# Patient Record
Sex: MALE | Race: WHITE
[De-identification: names, ages, dates, MRNs, and addresses within clinical notes are randomized per-mention and may not be internally consistent; named-entity substitution may affect disease eponyms.]

---

## 2019-03-17 ENCOUNTER — HOSPITAL ENCOUNTER (INPATIENT)
Dept: HOSPITAL 80 - FED | Age: 64
LOS: 8 days | Discharge: NURSING, REHAB OR LONG-TERM CARE | DRG: 57 | End: 2019-03-25
Attending: INTERNAL MEDICINE | Admitting: INTERNAL MEDICINE
Payer: COMMERCIAL

## 2019-03-17 DIAGNOSIS — R47.1: ICD-10-CM

## 2019-03-17 DIAGNOSIS — G12.20: Primary | ICD-10-CM

## 2019-03-17 DIAGNOSIS — K59.00: ICD-10-CM

## 2019-03-17 DIAGNOSIS — I10: ICD-10-CM

## 2019-03-17 DIAGNOSIS — B37.0: ICD-10-CM

## 2019-03-17 DIAGNOSIS — M62.81: ICD-10-CM

## 2019-03-17 DIAGNOSIS — R13.12: ICD-10-CM

## 2019-03-17 LAB
INR PPP: 0.98 (ref 0.83–1.16)
PLATELET # BLD: 267 10^3/UL (ref 150–400)
PROTHROMBIN TIME: 12.6 SEC (ref 12–15)

## 2019-03-17 PROCEDURE — G0480 DRUG TEST DEF 1-7 CLASSES: HCPCS

## 2019-03-17 PROCEDURE — A9585 GADOBUTROL INJECTION: HCPCS

## 2019-03-17 NOTE — EDPHY
H & P


Time Seen by Provider: 03/17/19 15:49


HPI/ROS: 





CHIEF COMPLAINT:  Slurred speech, right upper extremity weakness





HISTORY OF PRESENT ILLNESS:  The patient is a 64-year-old male who stated that 

he started to feel "funny" at 7:00 a.m..  The at noon he tried to get up and he 

fell.  He noticed right-sided weakness.  Patient had difficulty getting to the 

phone and was not unable to activate EMS until 14 14.





EMS activated the patient has a stroke alert.  The half-way was 119. 





REVIEW OF SYSTEMS:  


10 systems were reveiwed and are negative with the exception of the elements 

mentioned in the history of present illness.


Physical Exam: 





EMS vitals noted


GENERAL:  No acute distress, alert.  Protecting airway


HEENT:  Eyes normal to inspection, normal pharynx, no signs of dehydration.


NECK:  Normal, supple.  No spinal tenderness


RESPIRATORY:  Clear to auscultation bilaterally, no rales, rhonchi or wheezing.


CVS:  Regular rate and rhythm, no rubs, murmurs, or gallops.


ABDOMEN:  Soft, nontender, nondistended, no organomegaly.


BACK:  Normal to inspection, no CVA tenderness.


SKIN:  Normal color, no rash, warm, dry.  No pallor.


EXTREMITIES:  No pedal edema, no calf tenderness, no Homans sign or cords, no 

joint swelling.


NEURO/PSYCH:  Alert and oriented, normal mood and affect, slurred speech, right 

upper extremity weakness.  No obvious cranial nerve deficit.


Constitutional: 


 Initial Vital Signs











Temperature (C)  36.7 C   03/17/19 16:10


 


Heart Rate  110 H  03/17/19 16:10


 


Respiratory Rate  22 H  03/17/19 16:10


 


Blood Pressure  151/96 H  03/17/19 16:10


 


O2 Sat (%)  93   03/17/19 16:10








 











O2 Delivery Mode               Nasal Cannula


 


O2 (L/minute)                  2














Allergies/Adverse Reactions: 


 





Penicillins Allergy (Verified 03/17/19 16:17)


 








Home Medications: 














 Medication  Instructions  Recorded


 


Metoprolol Tartrate  03/17/19














Medical Decision Making





- Diagnostics


Imaging Results: 


 Imaging Impressions





Chest X-Ray  03/17/19 15:50


Impression:  Clear lungs. Negative portable chest.








Head CT  03/17/19 15:51


Impression: Negative. No acute intracranial hemorrhage or evidence of acute 

cortical ischemia.


 


Findings discussed with Emergency Department physician, Dr. Maryam Negron on 

March 17, 2019 at 1610 hours. 


 


 


 








Head CTA  03/17/19 15:51


Impression: 


1. Normal intracranial arterial circulation. No evidence of embolic disease or 

aneurysm.


2. Patent venous system.


 


 


CT Neck Angiogram:


 


Neck Angiogram: The cervicothoracic aorta gives rise to normal four-vessel neck 

anatomy. Origin of the carotid and vertebral arteries are widely patent. 

Minimal ostial plaque is present at bilateral vertebral artery origins. 

Calcified plaque in bilateral carotid bulbs results in less than 10% narrowing 

on the right, and less than 40% narrowing on the left. No ulcerated plaque, flow

-limiting stenosis, dissection, or occlusion. Bilateral vertebral arteries are 

patent and give rise to widely patent basilar artery.


 


Neck: The lung apices are clear, except for mild diffuse peribronchial 

thickening. No neck mass or lymphadenopathy. Moderate to severe degenerative 

disk disease is worse at the C4-C5, C5-C6 and C6-C7 levels. No fracture or bone 

lesion.


 


Impression:


1. No occlusion, dissection, or flow-limiting stenosis.


2. Calcified bilateral carotid plaque results in minimal right and mild left 

stenosis.


 


Findings discussed with Emergency Department physician, Dr. Maryam Negron on 

March 17, 2019 at 1616 hours.


 


 


 


 


 











ED Course/Re-evaluation: 





1550:  I met EMS on arrival.  I took report from the paramedic.  The patient 

went directly down to CT imaging.  Because the patient's symptoms started at 7:

00 a.m., he is in the 12 hr window.  Because of the timing, CT without and CT 

angio head and neck were ordered.





CBC and chemistry unremarkable.





16 10:  I discussed the case with Dr. Fierro.  Negative noncontrast head CT





16 17:  I discussed case with Dr. Fierro.  No occluding lesion on CT angio of 

head and neck.





16 20:  I discussed the case with [facility], Dr. Martinez.  We discussed 

the findings thus far.  At this time he is not feel the patient needs transfer.

  The patient is not a tPA candidate based on the onset of his symptoms at 7:00 

a.m..  There is no obstructing lesion on CT angiogram. 


Differential Diagnosis: 





My differential includes but is not limited to ischemic CVA, hemorrhagic CVA, 

dissection, aneurysm, electrolyte abnormality, sugar abnormality, dysrhythmia, 

ACS





- Data Points


Laboratory Results: 


 Laboratory Results





 03/17/19 15:52 





 03/17/19 15:52 





 











  03/17/19 03/17/19 03/17/19





  15:57 15:54 15:52


 


WBC      





    


 


RBC      





    


 


Hgb      





    


 


POC Hgb    17.0 gm/dL gm/dL  





    (13.7-17.5)  


 


Hct      





    


 


POC Hct    50 % %  





    (40-51)  


 


MCV      





    


 


MCH      





    


 


MCHC      





    


 


RDW      





    


 


Plt Count      





    


 


MPV      





    


 


Neut % (Auto)      





    


 


Lymph % (Auto)      





    


 


Mono % (Auto)      





    


 


Eos % (Auto)      





    


 


Baso % (Auto)      





    


 


Nucleat RBC Rel Count      





    


 


Absolute Neuts (auto)      





    


 


Absolute Lymphs (auto)      





    


 


Absolute Monos (auto)      





    


 


Absolute Eos (auto)      





    


 


Absolute Basos (auto)      





    


 


Absolute Nucleated RBC      





    


 


Immature Gran %      





    


 


Immature Gran #      





    


 


PT      





    


 


INR      





    


 


APTT      





    


 


POC Sodium    139 mEq/L mEq/L  





    (135-145)  


 


Sodium      137 mEq/L mEq/L





     (135-145) 


 


POC Potassium    4.3 mEq/L mEq/L  





    (3.3-5.0)  


 


Potassium      4.6 mEq/L mEq/L





     (3.5-5.2) 


 


POC Chloride    101 mEq/L mEq/L  





    ()  


 


Chloride      99 mEq/L mEq/L





     () 


 


Carbon Dioxide      19 mEq/l L mEq/l





     (22-31) 


 


POC Total CO2    22 mEq/L mEq/L  





    (22-31)  


 


Anion Gap      19 mEq/L H mEq/L





     (6-14) 


 


POC BUN    23 mg/dL mg/dL  





    (7-23)  


 


BUN      22 mg/dL mg/dL





     (7-23) 


 


Creatinine      0.6 mg/dL L mg/dL





     (0.7-1.3) 


 


POC Creatinine    0.6 mg/dL L mg/dL  





    (0.7-1.3)  


 


Estimated GFR      > 60 





    


 


Glucose      126 mg/dL H mg/dL





     () 


 


POC Glucose    131 mg/dL H mg/dL  





    ()  


 


Calcium      9.8 mg/dL mg/dL





     (8.5-10.4) 


 


POC Troponin I  0.03 ng/mL ng/mL    





   (0.00-0.08)   


 


Specimen Hemolysis      





    


 


Ethyl Alcohol      





    














  03/17/19 03/17/19 03/17/19





  15:52 15:52 15:50


 


WBC    7.46 10^3/uL 10^3/uL  





    (3.80-9.50)  


 


RBC    5.02 10^6/uL 10^6/uL  





    (4.40-6.38)  


 


Hgb    17.0 g/dL g/dL  





    (13.7-17.5)  


 


POC Hgb      





    


 


Hct    49.3 % %  





    (40.0-51.0)  


 


POC Hct      





    


 


MCV    98.2 fL fL  





    (81.5-99.8)  


 


MCH    33.9 pg pg  





    (27.9-34.1)  


 


MCHC    34.5 g/dL g/dL  





    (32.4-36.7)  


 


RDW    12.6 % %  





    (11.5-15.2)  


 


Plt Count    267 10^3/uL 10^3/uL  





    (150-400)  


 


MPV    8.6 fL L fL  





    (8.7-11.7)  


 


Neut % (Auto)    54.6 % %  





    (39.3-74.2)  


 


Lymph % (Auto)    40.3 % %  





    (15.0-45.0)  


 


Mono % (Auto)    3.5 % L %  





    (4.5-13.0)  


 


Eos % (Auto)    0.7 % %  





    (0.6-7.6)  


 


Baso % (Auto)    0.8 % %  





    (0.3-1.7)  


 


Nucleat RBC Rel Count    0.0 % %  





    (0.0-0.2)  


 


Absolute Neuts (auto)    4.07 10^3/uL 10^3/uL  





    (1.70-6.50)  


 


Absolute Lymphs (auto)    3.01 10^3/uL H 10^3/uL  





    (1.00-3.00)  


 


Absolute Monos (auto)    0.26 10^3/uL L 10^3/uL  





    (0.30-0.80)  


 


Absolute Eos (auto)    0.05 10^3/uL 10^3/uL  





    (0.03-0.40)  


 


Absolute Basos (auto)    0.06 10^3/uL 10^3/uL  





    (0.02-0.10)  


 


Absolute Nucleated RBC    0.00 10^3/uL 10^3/uL  





    (0-0.01)  


 


Immature Gran %    0.1 % %  





    (0.0-1.1)  


 


Immature Gran #    0.01 10^3/uL 10^3/uL  





    (0.00-0.10)  


 


PT  12.6 SEC SEC    





   (12.0-15.0)   


 


INR  0.98     





   (0.83-1.16)   


 


APTT  24.9 SEC SEC    





   (23.0-38.0)   


 


POC Sodium      





    


 


Sodium      





    


 


POC Potassium      





    


 


Potassium      





    


 


POC Chloride      





    


 


Chloride      





    


 


Carbon Dioxide      





    


 


POC Total CO2      





    


 


Anion Gap      





    


 


POC BUN      





    


 


BUN      





    


 


Creatinine      





    


 


POC Creatinine      





    


 


Estimated GFR      





    


 


Glucose      





    


 


POC Glucose      





    


 


Calcium      





    


 


POC Troponin I      





    


 


Specimen Hemolysis      410 





    


 


Ethyl Alcohol      < 10 mg/dL mg/dL





     (0-10) 











Medications Given: 


 








Discontinued Medications





Sodium Chloride (Ns)  500 mls @ 500 mls/hr IV EDNOW ONE


   PRN Reason: Protocol


   Stop: 03/17/19 16:49


   Last Admin: 03/17/19 16:39 Dose:  500 mls





Point of Care Test Results: 


 Chemistry











  03/17/19 03/17/19





  15:57 15:54


 


POC Sodium    139 mEq/L mEq/L





    (135-145) 


 


POC Potassium    4.3 mEq/L mEq/L





    (3.3-5.0) 


 


POC Chloride    101 mEq/L mEq/L





    () 


 


POC Total CO2    22 mEq/L mEq/L





    (22-31) 


 


POC BUN    23 mg/dL mg/dL





    (7-23) 


 


POC Creatinine    0.6 mg/dL L mg/dL





    (0.7-1.3) 


 


POC Glucose    131 mg/dL H mg/dL





    () 


 


POC Troponin I  0.03 ng/mL ng/mL  





   (0.00-0.08)  








 ISTAT H&H











  03/17/19





  15:54


 


POC Hgb  17.0 gm/dL gm/dL





   (13.7-17.5) 


 


POC Hct  50 % %





   (40-51) 














Departure





- Departure


Disposition: [facility] Inpatient Acute


Clinical Impression: 


 Aphasic disturbance, Weakness





Condition: Good


Referrals: 


Patient,NotPresent [Primary Care Provider] - As per Instructions

## 2019-03-17 NOTE — CPEKG
Test Reason : OPEN

Blood Pressure : ***/*** mmHG

Vent. Rate : 109 BPM     Atrial Rate : 109 BPM

   P-R Int : 179 ms          QRS Dur : 084 ms

    QT Int : 367 ms       P-R-T Axes : 041 043 042 degrees

   QTc Int : 495 ms

 

Sinus tachycardia

Left atrial enlargement

Borderline prolonged QT interval

 

Confirmed by Maryam Negron ([id number]) on 3/17/2019 11:06:56 PM

 

Referred By: Maryam Negron           Confirmed By:Maryam Negron

## 2019-03-17 NOTE — GHP
[f rep st]



                                                            HISTORY AND PHYSICAL





DATE OF ADMISSION:  03/17/2019



Mr. Washington is a 64-year-old gentleman with not much past medical history and, it sounds like, not a lot
 of medical care, who was brought into the emergency department as a stroke alert today.  He states luis f fernández felt funny around 7 a.m.  He tried to get up and he fell.  He had some right-sided weakness, and th
en it took him a while to get to the phone, and then ultimately he was able to get to the phone aroun
d 2 p.m. and came in at that point in time.  He had a stroke evaluation showing no intracranial hemor
rhage or other significant issue.  A noncontrast CT and head and neck CTA showed no significant vascu
lar disease. 



When I speak to the patient he is dysarthric, but his speech is fluent and he is able to repeat objec
ts, name a stethoscope, etc.  He said his voice has been like that for a number of months.  He does n
ot drink alcohol.  He does not appear intoxicated.  He does not have difficulty swallowing.  He state
s he is retired.  He spends much of his time at home.  It sounds like his neighbors said that he has 
been having on-and-off issues with speech since November when they were contacted by some emergency d
epartment personnel. 



The patient has no complaints such as shortness of breath, nausea, vomiting, diarrhea. 



He has thrush on his tongue.  It is unsure how long that has been there. 



The patient is alert and oriented and conversant.  I cannot understand what he used to do for work, b
ut he does not appear concerned that he is in the hospital and does not ask any questions.



REVIEW OF SYSTEMS:  Complete 10-point review of systems is conducted and negative except as noted in 
the HPI.



PAST MEDICAL HISTORY:  It sounds like hypertension.



ALLERGIES:  Penicillin.



HOME MEDICATIONS:  Metoprolol and enteric-coated aspirin.



SOCIAL HISTORY:  He is retired.  Lives in [address] [place] [address].  Denies tobacco or alcohol.



FAMILY HISTORY:  Reviewed and unremarkable.



PHYSICAL EXAMINATION:  PRESENTING VITALS:  Temperature 36.7, pulse 110, blood pressure 151/96, breath
ing __________ times a minute, 93% on room air.  GENERAL:  In no acute distress.  HEENT:  Sclerae ani
cteric.  Oropharynx clear.  Mucous membranes are moist.  NECK:  Supple without lymphadenopathy or JVD
.  LUNGS:  Clear to auscultation bilaterally.  HEART:  S1, S2.  ABDOMEN:  Soft, nontender, nondistend
ed.  LOWER EXTREMITIES:  Without edema.  CALVES:  Nontender.  SKIN:  Without rash.  NEUROLOGIC:  He i
s overall weak, but there is no right-sided weakness as described by some people.  His speech is dysa
rthric but fluent.  He has no real tremor that I can tell.  He does not have parkinsonian features.



LABORATORIES:  White count 7.5, hematocrit 49, platelets are 267,000, coags normal.  Sodium 137, pota
ssium 4.6, chloride 99, bicarb 19, BUN 22, creatinine 0.6, glucose 126.  Troponin 0.03, which is nega
tive in our system here.  Ethanol level is negative.  Head and neck CTA shows no significant vascular
 compromise with intact intracranial circulation and a 40% narrowing in the left carotid bulb.  Other
wise really unremarkable with no flow-limiting stenosis anywhere.  He has a patent venous system. Non
contrast head CT shows no atrophy, no midline shift, etc.  Chest x-ray shows clear lungs.  Chest x-ra
y interpreted by me.  EKG interpreted by me shows sinus tach at 109 with normal axis and intervals an
d no ST or T-wave changes.  I discussed the case with Dr. Maryam Negron.



ASSESSMENT/PLAN:  A 64-year-old gentleman with fall, dysarthria, tachycardia.

1.  Dysarthria.  This is kind of uncertain as to what is causing this.  He does have thrush which I n
ote.  I will check an MRI to evaluate for old stroke.  I will have speech therapy see him.  Neck CT d
id not show any mass.  He had a normal exam.

2.  Thrush.  Check HIV.  Will check a cortisol level.

3.  Sinus tachycardia.  The patient takes a beta blocker. Perhaps he stopped taking this.  Echocardio
gram has been ordered.  We will repeat a troponin.

4.  Encephalopathy.  The patient is modestly confused.  He has a normal chest x-ray.  We will send a 
urinalysis and urine cultures and check blood cultures.

5.  Inferior visual field cut.  I will go ahead and check an MRI as mentioned.  I will do this with alessandra morgan.

6.  Disposition:  Inpatient status.





Job #:  [id number]

## 2019-03-17 NOTE — ASMTCMCOM
CM Note

 

CM Note                       

Notes:

Reviewed chart. Pt presented to the Emergency Department via EMS with aphasia and right sided 

weakness - stroke alert. History includes Hepatitis C, back injury, HTN and flat foot. Pt is single 


and lives alone in [place]. Met with pt to assist in locating emergency contacts. Pt requested CM 

contact Peter Centeno, pt's neighbor. Peter located via online white pages. Call placed to Timoteo Centeno [phone], [address]. Peter was aware the pt had contacted EMS and was 

transported to [facility]. Peter has been Rudy's neighbor for quite some time, but states that "Rudy is 

very reclusive, private and doesn't share much." Peter reports "Rudy travels to [place], [place] a 


couple of times per year," but Peter is unclear what he does when he is there. Peter states that he 

and his wife noticed the pt "had increased weakness and difficulty with speech back in November 

when he returned from a trip to [facility]." Peter and Oralia will come see the pt tomorrow in the 

hospital. Update provided to Rudy.



Rudy requested CM contact Ismael Sabrina [phone], his friend from [place]. Call placed to 

Ismael. Ismael reports knowing the pt for many years. Ismael states the pt does not have any living 

family - the pt's brother  in . Ismael reports the pt has Hepatitis C, flat foot secondary to 


a back injury in the  and HTN. Ismael states the pt last visited [place] the end of 

October, early 2018. At that time Imsael noted the pt had slurred, slow speech and increased 


weakness. Ismael states the pt "self adjusted his anti-hypertensive medications in September or 

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 Ismael will keep in touch with the pt. Main hospital number provided. Update 

provided to Rudy.



Pt to be admitted for further evaluation and treatment. Discharge needs remain unclear at this 

time. Will await PT/OT evals. CM will continue to follow.







Discharge Plan: To be determined

 

Date Signed:  2019 06:32 PM

Electronically Signed By:Bailee Augustine RN

## 2019-03-18 LAB — HIV TYPE 1 AND 2: NEGATIVE

## 2019-03-18 RX ADMIN — ASPIRIN SCH: 81 TABLET, DELAYED RELEASE ORAL at 11:30

## 2019-03-18 RX ADMIN — ENOXAPARIN SODIUM SCH MG: 100 INJECTION SUBCUTANEOUS at 11:29

## 2019-03-18 RX ADMIN — SODIUM CHLORIDE SCH MLS: 900 INJECTION, SOLUTION INTRAVENOUS at 00:43

## 2019-03-18 RX ADMIN — NYSTATIN SCH UNIT: 500000 SUSPENSION ORAL at 22:57

## 2019-03-18 RX ADMIN — SODIUM CHLORIDE SCH MLS: 900 INJECTION, SOLUTION INTRAVENOUS at 13:14

## 2019-03-18 RX ADMIN — METOPROLOL SUCCINATE SCH: 50 TABLET, EXTENDED RELEASE ORAL at 11:31

## 2019-03-18 NOTE — ECHO
[email or web address]





[address] [address] 

Main: [phone] 



Echocardiography Examination 

Transthoracic 



Name:            TIAGO FRASER                           MR#:

[id number]

Study Date:      03/18/2019                             Study Time:

03:31 PM

YOB: 1955                             Age:

64 year(s)

Height:          180.3 cm (71 in.)                      Weight:

76.66 kg (169 lb.)

BSA:             1.96 m2                                Gender:

Male

Examination:     Echo                                   Contrast: 

Image Quality:   Adequate                               Rhythm: 

Heart Rate:      102 bpm                                BP:

129 mmHg/70 mmHg

Indication:      ischemic stroke 



Procedure Staff 

Referring Physician: 

Echocardiographer:         Jazmine Bhat RUST 

Reading Physician:         Wesley Newby MD 

Requesting Provider: 

Indication:                ischemic stroke 



Measurements 

Chambers                                           AV/MV 

Label                 Value       Normal Value          Label

Value       Normal Value

IVSd, 2D               1.1 cm     (0.6cm - 1.1cm)       AV PGmax

10 mmHg

LVDd, 2D               4.2 cm     (4.2cm - 5.9cm)       AV Vmax

1.58 m/s

LVDs, 2D               2.6 cm     (2.1cm - 4cm)         NAZ

(continuity eq.  2.2 cm2

LVEF, 2D               66 %       (54% - 74%)           Vmax) 

LVEF, BP               70 %       (55% - 70%)           MV A Vmax

0.96 m/s

LVEF, MOD2             71 %       (55% - 70%)           MV DT

143 ms

LVEF, MOD4             67 %       (55% - 70%)           MV E' lateral

0.08 m/s

LVOT PGmax             6 mmHg                           MV E' mean

0.07 m/s

LVOT Vmax              1.22 m/s   (0.7m/s - 1.1m/s)     MV E' septal

0.06 m/s

LVOTd                  1.9 cm     (1.9cm - 2.1cm)       MV E Vmax

0.66 m/s

LVPWd, 2D              1.1 cm     (0.6cm - 1cm)         MV E/A

0.69

RVDd, 2D               3.2 cm     (1.9cm - 3.8cm)       MV E/E'

lateral      8

TAPSE                  2.2 cm                           MV E/E' mean

9.43

LA Area, A2C           10.2 cm2   (0cm2 - 20cm2)        MV E/E' septal

11 (0.45 - 1.25)

LA Volume, A2C         19 ml      (18ml - 58ml)    TV/PV 

LA Volume, A4C         49 ml      (16ml - 34ml)         Label

Value       Normal Value

LA Volume, BP          33 ml      (18ml - 58ml)         PV PGmax

5 mmHg

LAD Index, 2D          1.63 cm/m2                       PV Vmax,

Caliper     1.12 m/s     (0.6m/s - 0.9m/s)

LADs, 2D               3.2 cm     (3cm - 4cm) 

LAESV index, MOD4      25 ml/m2 



Patient: TIAGO FRASER                        MRN: [id number]

Study Date: 03/18/2019   Page 1 of 3

03:31 PM 









RA Area 13 cm2 

Additional Vessels 

Label                 Value       Normal Value 

AoAsc                  2.9 cm 

AoRoot, 2D             3.2 cm     (1.4cm - 2.6cm) 



Conclusions 



Left Ventricle: 

 CONCLUSIONS:1)Normal to hyperdynamic LV systolic function with a

LVEF of 70% and normal wall motions.2)Mild

concentric LVH with mild diastolic dysfunction noted.3)Trivial TR

noted. Unable to assess accurate PA pressures.4)No

cardiac thrombus seen.5)No PFO or ASD noted by color Doppler.  



Findings 



Left Ventricle: 

Left ventricle is normal in size.  



CONCLUSIONS: 

1)Normal to hyperdynamic LV systolic function with a LVEF of 70% and

normal wall motions.

2)Mild concentric LVH with mild diastolic dysfunction noted. 

3)Trivial TR noted. Unable to assess accurate PA pressures. 

4)No cardiac thrombus seen. 

5)No PFO or ASD noted by color Doppler. The ejection fraction,

measured by Simpsons method, is 70 %. There is mild

concentric left ventricular hypertrophy. There are no regional wall

motion abnormalities. Grade I Diastolic Dysfunction.

Right Ventricle: 

Normal size right ventricle. Right ventricular wall thickness is

normal. Right ventricular systolic function is normal.

Left Atrium: 

The left atrium is normal in size.  

IAS: 

Normal appearing atrial septum.  

Right Atrium: 

The right atrium is normal in size.  

Mitral Valve: 

Normal . No mitral regurgitation. No mitral valve stenosis. There is

mild mitral annular calcification.

Aortic Valve: 

The aortic valve is structurally normal and trileaflet. No aortic

valve regurgitation. There is no aortic stenosis.

Tricuspid Valve: 

Tricuspid valve leaflets are normal in appearance and function.

Trivial tricuspid regurgitation. No tricuspid valve

stenosis. Pulmonary artery pressure cannot be assessed due to

inadequate TR signal.

Pulmonic Valve: 

Pulmonic leaflets exhibit normal cuspal separation. Trivial pulmonic

valve regurgitation is present.

Aorta: 

The aorta is normal. The aortic root size in 2D measures 3.2 cm. The

aortic root exhibits normal size. The ascending

aorta measures 2.9 cm. Ascending aorta is normal in size.  

Aorta Measurements 

AoRoot, 2D is 3.2 cm.  

IVC: 

The inferior vena cava is normal in size and course.  

Pericardium: 

A pericardial fat pad is present. A trivial pericardial effusion was

identified.



Exam Details 



Patient: TIAGO FRASER                        MRN: [id number]

Study Date: 03/18/2019   Page 2 of 3

03:31 PM 









Procedure Ordered: Echo 

Procedure Status:          Routine study 

Image Quality:             Adequate 

Facility Location:         Cardiac Echo 1 



Electronically signed by Wesley Newby MD on 03/18/2019 at 04:17 PM 

(No Signature Object) 



Patient: TIAGO FRASER                        MRN: [id number]

Study Date: 03/18/2019   Page 3 of 3

03:31 PM 







[id number]

## 2019-03-18 NOTE — HOSPPROG
Hospitalist Progress Note


Assessment/Plan: 





63 yo M w dysphagia, thrush, generalized weakness and possible visual field 

cut. no stroke on MRI





neuro: primary differential is myasthenia vs progressive supranuclear palsy


   d/w neuro, trial of steroids


   MG labs pending


   


thrush: HIV neg


   treat





dysphagia: nectar thick liquids


   ongoing SLP





weakness: likely related to #1





proph :lmwh





?stroke: no





dispo: inpt


   will need inpt  rehab


Subjective: case d/w dr stock.  mri largely neg.  seen by neuro


Objective: 


 Vital Signs











Temp Pulse Resp BP Pulse Ox


 


 36.6 C   92   18   129/70 H  95 


 


 03/18/19 11:24  03/18/19 16:00  03/18/19 11:24  03/18/19 11:24  03/18/19 11:24








 











 03/17/19 03/18/19 03/19/19





 05:59 05:59 05:59


 


Intake Total  500 


 


Output Total  600 


 


Balance  -100 








 











PT  12.6 SEC (12.0-15.0)   03/17/19  15:52    


 


INR  0.98  (0.83-1.16)   03/17/19  15:52    














- Physical Exam


Constitutional: no apparent distress, appears nourished


Eyes: PERRL, anicteric sclera


Ears, Nose, Mouth, Throat: other (thrush)


Cardiovascular: regular rate and rhythym, no murmur, rub, or gallop


Respiratory: no respiratory distress, no rales or rhonchi


Gastrointestinal: normoactive bowel sounds, soft, non-tender abdomen


Genitourinary: No power in urethra


Skin: warm, normal color


Musculoskeletal: No full muscle strength


Neurologic: AAOx3, other


Psychiatric: interacting appropriately





ICD10 Worksheet


Patient Problems: 


 Problems











Problem Status Onset


 


Aphasic disturbance Acute  


 


Weakness Acute

## 2019-03-18 NOTE — PDMN
Medical Necessity


Medical necessity: Pt meets inpt criteria per MD order and Neurology GRG. 64 y/

o s/p fall presented to ED w/dysarthria, confusion, and tachycardia. Neuor 

consult pending, SP/PT/OT evals and ECHO pending, persistent tachycardia 

through night,  anticipate>2MN for further workup of above.

## 2019-03-18 NOTE — NEUROPROG
Assessment: 


Gifty_1955


  -


Neurology Consult:


-


CC: Weakness, speech difficulties


-


HPI: 


3/18/19: Pt had been noting speech problems intermittently since November 2018 

but otherwise no significant medical problems.  On 3/17/19 at 7 am he noted 

right sided weakness that persisted per patient until 2 pm when he came to the 

[facility] ER for evaluation.  At that time he was only noted to have dysarthric 

speech which he indicated has been present since November 2018 and he was 

mildly confused but not focally weak.  Pt also had an oral thrush infection 

which may be affecting his speech.  Head CT, CTA head/neck, and brain MRI wwo 

all unremarkable.  Neurologic exam showed severe dysarthria, mild cognitive 

problems (thought it was 2018, problems with motor planning), generalized 

intermittent weakness, and possible right proximal focal weakness.  Given 

length of symptoms of weakness (hours) in setting of normal brain MRI makes TIA 

or stroke unlikely.  Pt seems to most likely have a neurodegenerative process 

such as progressive supranuclear palsy.  Treatment is likely symptomatic.  I 

will check labs for any myasthenia and have pt f/u in neurology clinic in 1-2 

weeks to obtain EMG/NCS looking for any denervation in the tongue.  Agree with 

PT/OT/Speech eval.


-


PMHx: HTN?


-


Home Meds: aspirin, metoprolol


-


SHx: retired


FHx: NC


-


ROS: Pt denied acute fever, total vision loss, active severe chest pain, 

respiratory failure, total body severe rash, total bowel/bladder incontinence, 

psychosis, active seizures, or active bleeding


-


O:


VS reviewed


General: Alert


Eyes: Fundoscopic exam not able to visualize optic disks


CV: Heart RRR, no murmur, no carotid bruit


Lungs: Clear to auscultation bilaterally, no rhonchi or rales


Neuro:


- Mental: 


.     Oriented x person/place but not date


.     concentration appears normal


.     speech fluency/comprehension difficult to test due to severe dysarthria 

but comprehension seems intact


.     memory appears normal


.     fund of knowledge appear intact


- Cranial Nerves:


.     II: PERRL, VFFTC


.     III/IV/VI: EOMI, no nystagmus, normal smooth pursuits, no Ptosis


.     V: facial sensation intact to LT


.     VII: face symmetric to eye closure and smile


.     VIII: hearing intact to conversation


.     IX/X: uvula raises symmetrically


.     XI: SCM 5/5 B/L strength


.     XII: tongue protrudes midline w/nl strength


- Motor: 


.     Tone: normal tone in all 4 extremity


.     Strength: proximal right arm weakness, intermittent generalized weakness


- Reflexes: B/L bic/BR/patella 2/4


- Sensory: all 4 extremity intact to light touch


- Coord: some problems following alternating hand movements seems concerning 

for motor planning


- Gait: deferred


-


Labs:


3/17/19- CBC wnl, coags wnl, Chem Cr 0.6L Gluc 131H, 


-


Rads:


3/17/19- Head CT wo: no acute bleed (I personally visualized the images on 3/17/

19)


3/17/19- Head/neck CTA: no flow limiting stenosis, no significant findings


3/17/19- Brain MRI wwo: no acute stroke, minimal white matter disease


-


Assessment:


1. Speech Disturbance, generalized weakness: concern for underlying 

neurodegenerative process such as bulbar supranuclear palsy


-


Plan:


- Labs: myasthenic panel, TSH, B12


- Agree with PT/OT/Speech


- F/U in neurology clinic in 1-2 weeks for EMG/NCS with Dr. Rizvi to check 

for any denervation in tongue





Objective: 





 Vital Signs











Temp Pulse Resp BP Pulse Ox


 


 36.6 C   103 H  18   148/81 H  96 


 


 03/18/19 08:00  03/18/19 08:00  03/18/19 08:00  03/18/19 08:00  03/18/19 08:00








 











 03/17/19 03/18/19 03/19/19





 05:59 05:59 05:59


 


Intake Total  500 


 


Output Total  600 


 


Balance  -100 








 











PT  12.6 SEC (12.0-15.0)   03/17/19  15:52    


 


INR  0.98  (0.83-1.16)   03/17/19  15:52    











Allergies/Adverse Reactions: 


 





Penicillins Allergy (Verified 03/17/19 16:17)

## 2019-03-19 RX ADMIN — METOPROLOL SUCCINATE SCH MG: 50 TABLET, EXTENDED RELEASE ORAL at 09:34

## 2019-03-19 RX ADMIN — NYSTATIN SCH UNIT: 500000 SUSPENSION ORAL at 05:00

## 2019-03-19 RX ADMIN — NYSTATIN SCH: 500000 SUSPENSION ORAL at 13:18

## 2019-03-19 RX ADMIN — ENOXAPARIN SODIUM SCH MG: 100 INJECTION SUBCUTANEOUS at 09:35

## 2019-03-19 RX ADMIN — NYSTATIN SCH UNIT: 500000 SUSPENSION ORAL at 16:44

## 2019-03-19 RX ADMIN — NYSTATIN SCH UNIT: 500000 SUSPENSION ORAL at 20:48

## 2019-03-19 RX ADMIN — ASPIRIN SCH MG: 81 TABLET, DELAYED RELEASE ORAL at 09:34

## 2019-03-19 RX ADMIN — SODIUM CHLORIDE SCH MLS: 900 INJECTION, SOLUTION INTRAVENOUS at 01:40

## 2019-03-19 NOTE — HOSPPROG
Hospitalist Progress Note


Assessment/Plan: 





65 yo M w dysphagia, thrush, generalized weakness and possible visual field 

cut. no stroke on MRI





neuro: primary differential is myasthenia vs progressive supranuclear palsy


   d/w neuro, trial of steroids


   MG labs pending


   


thrush: HIV neg


   treat





dysphagia: nectar thick liquids


   ongoing SLP





weakness: likely related to #1





proph :lmwh





?stroke: no





weakness: c spine mri today





dispo: inpt


   will need inpt  rehab


Subjective: case d/w dr stock.  no change w addition of steroids


Objective: 


 Vital Signs











Temp Pulse Resp BP Pulse Ox


 


 36.8 C   87   21 H  162/93 H  92 


 


 03/19/19 11:43  03/19/19 11:43  03/19/19 11:43  03/19/19 11:43  03/19/19 11:43








 











 03/18/19 03/19/19 03/20/19





 05:59 05:59 05:59


 


Intake Total 500 900 100


 


Output Total 600 900 525


 


Balance -100 0 -425








 











PT  12.6 SEC (12.0-15.0)   03/17/19  15:52    


 


INR  0.98  (0.83-1.16)   03/17/19  15:52    














- Physical Exam


Constitutional: no apparent distress, appears nourished


Eyes: PERRL, anicteric sclera


Ears, Nose, Mouth, Throat: moist mucous membranes, hearing normal


Cardiovascular: regular rate and rhythym, no murmur, rub, or gallop


Respiratory: no respiratory distress, no rales or rhonchi


Gastrointestinal: normoactive bowel sounds, soft, non-tender abdomen


Genitourinary: no bladder fullness, No power in urethra


Skin: warm, no induration


Musculoskeletal: No full muscle strength


Neurologic: No AAOx3





ICD10 Worksheet


Patient Problems: 


 Problems











Problem Status Onset


 


Aphasic disturbance Acute  


 


Weakness Acute

## 2019-03-19 NOTE — NEUROPROG
Assessment: 


Gifty_1955


  -


Neurology Consult:


-


CC: F/U for dysarthria


-


Narrative Summary:


3/18/19: Pt had been noting speech problems intermittently since November 2018 

but otherwise no significant medical problems.  On 3/17/19 at 7 am he noted 

right sided weakness that persisted per patient until 2 pm when he came to the 

[facility] ER for evaluation.  At that time he was only noted to have dysarthric 

speech which he indicated has been present since November 2018 and he was 

mildly confused but not focally weak.  Pt also had an oral thrush infection 

which may be affecting his speech.  Head CT, CTA head/neck, and brain MRI wwo 

all unremarkable.  Neurologic exam showed severe dysarthria, mild cognitive 

problems (thought it was 2018, problems with motor planning), generalized 

intermittent weakness, and possible right proximal focal weakness.  Given 

length of symptoms of weakness (hours) in setting of normal brain MRI makes TIA 

or stroke unlikely.  Pt seems to most likely have a neurodegenerative process 

such as progressive bulbar supranuclear palsy.  Treatment is likely 

symptomatic.  I will check labs for any myasthenia and have pt f/u in neurology 

clinic in 1-2 weeks to obtain EMG/NCS looking for any denervation in the 

tongue.  Agree with PT/OT/Speech eval.


-


HPI:


F/U 3/19/19.  Given how severely affected the patient is I feel it is 

reasonable to give a 7 day trial of prednisone 80 mg qd to see if this improves 

his symptoms (possibly atypical myasthenia gravis?).  However, if no clear 

improvement after 7 days then I will stop prednisone.  No clinical change in 

patient today.  B12/TSH unremarkable.


-


PMHx: HTN?


-


Home Meds: aspirin, metoprolol


-


SHx: retired


FHx: NC


-


ROS: Pt denied acute fever, total vision loss, active severe chest pain, 

respiratory failure, total body severe rash, total bowel/bladder incontinence, 

psychosis, active seizures, or active bleeding


-


Labs:


3/17/19- CBC wnl, coags wnl, Chem Cr 0.6L Gluc 131H, HIV 1 /2 negative


3/18/19- TSH wnl, B12 438


-


Rads:


3/17/19- Head CT wo: no acute bleed


3/17/19- Head/neck CTA: no flow limiting stenosis, no significant findings


3/17/19- Brain MRI wwo: no acute stroke, minimal white matter disease


-


Assessment:


1. Speech Disturbance, generalized weakness: concern for underlying 

neurodegenerative process such as progressive bulbar supranuclear palsy.  Will 

give empiric trial of prednisone 80 mg qd x 7 days (began on 3/18/19)


-


Plan:


- Labs: myasthenic panel (pending)


- empiric trial of prednisone 80 mg qd x 7 days (began on 3/18/19), if no clear 

improvement in 7 days then would stop steroids


- Agree with PT/OT/Speech


- F/U in neurology clinic in 1-2 weeks for EMG/NCS with Dr. Rizvi to check 

for any denervation in tongue





Objective: 





 Vital Signs











Temp Pulse Resp BP Pulse Ox


 


 36.8 C   105 H  20   153/91 H  93 


 


 03/19/19 08:00  03/19/19 08:00  03/19/19 08:00  03/19/19 08:00  03/19/19 08:00








 











 03/18/19 03/19/19 03/20/19





 05:59 05:59 05:59


 


Intake Total 500 900 100


 


Output Total 600 900 275


 


Balance -100 0 -175








 











PT  12.6 SEC (12.0-15.0)   03/17/19  15:52    


 


INR  0.98  (0.83-1.16)   03/17/19  15:52    











Allergies/Adverse Reactions: 


 





Penicillins Allergy (Verified 03/17/19 16:17)

## 2019-03-20 RX ADMIN — METOPROLOL SUCCINATE SCH MG: 50 TABLET, EXTENDED RELEASE ORAL at 08:42

## 2019-03-20 RX ADMIN — ENOXAPARIN SODIUM SCH MG: 100 INJECTION SUBCUTANEOUS at 08:44

## 2019-03-20 RX ADMIN — ASPIRIN SCH MG: 81 TABLET, DELAYED RELEASE ORAL at 08:43

## 2019-03-20 RX ADMIN — NYSTATIN SCH UNIT: 500000 SUSPENSION ORAL at 12:22

## 2019-03-20 RX ADMIN — NYSTATIN SCH UNIT: 500000 SUSPENSION ORAL at 05:44

## 2019-03-20 NOTE — HOSPPROG
Hospitalist Progress Note


Assessment/Plan: 





65 yo M w dysphagia, thrush, generalized weakness and possible visual field 

cut. no stroke on MRI





neuro: primary differential is myasthenia vs progressive supranuclear palsy


   d/w neuro, trial of steroids day 3/8


   MG labs pending


   


thrush: HIV neg


   treated





dysphagia: nectar thick liquids


   ongoing SLP





weakness: likely related to #1





proph :lmwh





?stroke: no





weakness: c spine mri w no cause





dispo: inpt


   will need inpt  rehab


Subjective: case d/w dr stock.  speech unchanged


Objective: 


 Vital Signs











Temp Pulse Resp BP Pulse Ox


 


 36.9 C   115 H  19   152/80 H  92 


 


 03/20/19 08:00  03/20/19 08:42  03/20/19 08:00  03/20/19 13:30  03/20/19 08:00








 











 03/19/19 03/20/19 03/21/19





 05:59 05:59 05:59


 


Intake Total 900 250 


 


Output Total 900 1300 180


 


Balance 0 -1050 -180








 











PT  12.6 SEC (12.0-15.0)   03/17/19  15:52    


 


INR  0.98  (0.83-1.16)   03/17/19  15:52    














- Physical Exam


Constitutional: no apparent distress, appears nourished


Eyes: PERRL, anicteric sclera


Ears, Nose, Mouth, Throat: other (dysarthric. thrush resolved)


Cardiovascular: regular rate and rhythym, no murmur, rub, or gallop


Respiratory: no respiratory distress, no rales or rhonchi


Gastrointestinal: normoactive bowel sounds, soft, non-tender abdomen


Genitourinary: no bladder fullness, No power in urethra


Skin: warm


Musculoskeletal: no muscle tenderness, No full muscle strength


Neurologic: AAOx3





ICD10 Worksheet


Patient Problems: 


 Problems











Problem Status Onset


 


Aphasic disturbance Acute  


 


Weakness Acute

## 2019-03-20 NOTE — GCON
[f 
rep st]



                                                                    CONSULTATION





NEUROSURGICAL CONSULTATION



DATE OF CONSULTATION:  03/20/2019



REASON FOR CONSULTATION:  Weakness.



HISTORY OF PRESENT ILLNESS:  The patient is a 64-year-old gentleman who was 
admitted on 03/17/2019, through the emergency department as a stroke alert.  
Apparently, the patient was having some difficulty with his speech and right-
sided weakness that prompted him to come to the emergency room.  He underwent 
imaging of the head and neck, which did not show any intracranial hemorrhage or 
other significant issues.  The patient was subsequently seen by Neurology and 
started on prednisone for his dysarthria and weakness, and has not improved 
significantly.  A neurosurgical consult was requested after a cervical MRI was 
performed demonstrating cervical degenerative disk disease with bilateral 
foraminal stenosis moderate-to-severe from C5 through C7. 



My conversation today with the patient was somewhat limited due to the patient'
s dysarthria; however, the patient reports that he has been gradually noticing 
upper and lower extremity weakness over the past 6 months since October of last 
year.  Approximately 2 weeks ago, he was unable to walk.  He states that he has 
not had any bowel or bladder changes.  His speech issues started 3 days ago.  
The patient was diagnosed with oral thrush, which has been treated, but does 
not seem to be affecting his overall speech ability.  The patient denies any 
neck pain or back pain.  He has not experienced any tingling.



PAST MEDICAL HISTORY:  Hypertension.



SOCIAL HISTORY:  The patient is retired.  He lives alone.  He does not use 
tobacco or alcohol.



FAMILY HISTORY:  The patient was unable to recall the cause of death of his 
mother or father.



ALLERGIES:  Penicillin.



PHYSICAL EXAM:  GENERAL:  Tired appearing 64-year-old male in no apparent 
distress.  HEAD, EARS, NOSE, THROAT:  The patient has a gray tongue and has 
recently been treated for thrush.  HEAD:  Normocephalic, atraumatic.  
EXTREMITIES:  Within normal limits, with the exception of notable muscle 
atrophy of the bilateral web spaces of both hands. 

NEUROLOGIC:  Patient is awake, alert, and oriented x4.  He is able to follow 
commands.  He has dysarthric speech.  He has no facial droop.  He has normal 
sensation in the V1, V2, V3 distributions bilaterally.  He is hyporeflexic in 
bilateral biceps and brachioradialis with negative Holt's.  He has 2+ out of 
4 bilateral patellar tendon reflexes with no clonus.  He has generalized 
weakness of the bilateral upper and lower extremities with 2/4 strength in the 
right biceps and triceps and 2/4 strength in bilateral  and wrist flexors 
and wrist extensors.  He has 4/5 strength in bilateral deltoids, 5/5 strength 
in bilateral shoulder shrugs.  He has 3/4 strength in right dorsiflexor and 
plantar flexor, 4/5 in left dorsiflexor, and 3/5 in left plantar flexor.  He 
has 5/5 strength in bilateral iliopsoas, 4/5 in bilateral quads and hamstrings.



IMAGING:  Tthe MRI of the cervical spine from 03/19/2019, shows moderate-to-
severe degenerative disk disease from C4-5 through C6-7 with dorsal disk 
osteophyte complexes and bilateral facet arthropathy contributing to mild-to-
moderate central canal stenosis, worse at C4-5 and C6-7, and moderate-to-severe 
severe bilateral foraminal stenosis at C4-5 and C6-7.  There is no abnormal 
cord signal.



IMPRESSION:  This is a 64-year-old gentleman who reports progressively 
worsening upper and lower extremity weakness for the past 6 months with 
inability to walk starting approximately 2 weeks ago and recent onset of 
dysarthric speech.  These global symptoms are not explained by his cervical 
spine findings and we would favor a neurodegenerative process instead.  
Neurology is also following this patient and there are labs pending for 
myasthenia gravis.  Blood cultures from the 17th have no growth to date.  At 
this time, no neurosurgical intervention would be recommended and we would 
defer further treatment to Neurology.  This information was discussed with Dr. Jeremi Robb today.



Patient was seen by Neurosurgery staff at approx 5pm on 3/20.  The cervical 
spine findings do not explain the diffuse weakness and no treatment is needed 
currently for the cervical findings.  We continue to defer to our colleagues in 
Neurology.  Please feel free to call us with questions.





Job #:  [id number]

MTDD

## 2019-03-20 NOTE — NEUROPROG
Assessment: 


Gifty_1955


  -


Neurology Consult:


-


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


-


Narrative Summary:


3/18/19: Pt had been noting speech problems intermittently since November 2018 

but otherwise no significant medical problems.  On 3/17/19 at 7 am he noted 

right sided weakness that persisted per patient until 2 pm when he came to the 

[facility] ER for evaluation.  At that time he was only noted to have dysarthric 

speech which he indicated has been present since November 2018 and he was 

mildly confused but not focally weak.  Head CT, CTA head/neck, and brain MRI 

wwo all unremarkable.  Neurologic exam showed severe dysarthria, mild cognitive 

problems (thought it was 2018, problems with motor planning), generalized 

intermittent weakness, and possible right proximal focal weakness.  Given 

length of symptoms of weakness (hours) in setting of normal brain MRI makes TIA 

or stroke unlikely.  Pt seems to most likely have a neurodegenerative process 

such as a motor neuron disease affecting bulbar muscles.  Treatment is likely 

symptomatic.  I will check labs for any myasthenia and have pt f/u in neurology 

clinic in 1-2 weeks to obtain EMG/NCS looking for any denervation in the tongue 

with Dr. Adam Rizvi (Neuromuscular expert).


-


F/U 3/19/19.  Given how severely affected the patient is I feel it is 

reasonable to give a 7 day trial of prednisone 80 mg qd to see if this improves 

his symptoms (possibly atypical myasthenia gravis?).  However, if no clear 

improvement after 7 days then I will stop prednisone.  No clinical change in 

patient today.  B12/TSH unremarkable.


-


HPI:


F/U 3/20/19.  No improvement of dysarthria on steroids.  Pt had cervical MRI 

showing mild to mod degen changes but no clear explanation for his severe 

dysarthria.  No new events.  Pt sitting up in chair.


-


PMHx: HTN?


-


SHx: retired


FHx: NC


-


ROS: Pt denied acute fever, total vision loss, active severe chest pain, 

respiratory failure, total body severe rash, total bowel/bladder incontinence, 

psychosis, active seizures, or active bleeding


-


Labs:


3/17/19- CBC wnl, coags wnl, Chem Cr 0.6L Gluc 131H, HIV 1 /2 negative


3/18/19- TSH wnl, B12 438


-


Rads:


3/17/19- Head CT wo: no acute bleed


3/17/19- Head/neck CTA: no flow limiting stenosis, no significant findings


3/17/19- Brain MRI wwo: no acute stroke, minimal white matter disease


3/19/19- Cervical MRI wwo: Moderate to severe degenerative disk disease from C4-

5 through C6-C7 with dorsal disk/osteophyte complexes, bilateral uncovertebral 

osteophytes and bilateral facet arthropathy resulting in mild to moderate 

central canal stenosis, worst at C4-C5 and C6-C7, and moderate to severe 

bilateral neural foraminal stenosis.  Mild cord compression without cord edema 

or myelomalacia.  No enhancing lesions.


-


Assessment:


1. 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 

concern for underlying neurodegenerative process such as bulbar variant of 

motor neuron disease.  Will give empiric trial of prednisone 80 mg qd x 7 days (

began on 3/18/19)


-


2. Cervical MRI wwo on 3/19/19 showing moderate to severe degen changes: Not 

likely the cause of his severe dysarthria.  I do not suspect this is a 

symptomatic finding but I will re-consider if he develops neck pain or any new 

symptoms suggesting cord compression (increased tone, change in bowel/bladder 

habits, numbness in arms/legs)


-


Plan:


- Labs: myasthenic panel (pending)


- empiric trial of prednisone 80 mg qd x 7 days (began on 3/18/19), if no clear 

improvement in 7 days then would stop steroids


- Agree with PT/OT/Speech


- F/U in neurology clinic in 1-2 weeks for EMG/NCS with Dr. Rizvi to check 

for any denervation in tongue





Objective: 





 Vital Signs











Temp Pulse Resp BP Pulse Ox


 


 36.9 C   115 H  19   160/97 H  92 


 


 03/20/19 08:00  03/20/19 08:42  03/20/19 08:00  03/20/19 08:42  03/20/19 08:00








 











 03/19/19 03/20/19 03/21/19





 05:59 05:59 05:59


 


Intake Total 900 250 


 


Output Total 900 1300 180


 


Balance 0 -1050 -180








 











PT  12.6 SEC (12.0-15.0)   03/17/19  15:52    


 


INR  0.98  (0.83-1.16)   03/17/19  15:52    











Allergies/Adverse Reactions: 


 





Penicillins Allergy (Verified 03/17/19 16:17)

## 2019-03-20 NOTE — ASMTCMCOM
CM Note

 

CM Note                       

Notes:

Pts case discussed w/ Dr. Castanon. Therapies all continue to recommend inpatient rehab. CM spoke to 

[facility] about this pt and she reports that they will most likely not be able to take him. CM 


met w/ pt. Pt is agreeable to going to SNF. CM provided him w/ senior blue book. Pt chose [facility] because he wanted to stay in [place]. Referral sent and non triggering pasrr completed. CM to 

follow.







Plan: Inpatient rehab vs SNF/[facility]

 

Date Signed:  03/20/2019 04:22 PM

Electronically Signed By:FATUMA Kraft

## 2019-03-21 RX ADMIN — METOPROLOL SUCCINATE SCH MG: 50 TABLET, EXTENDED RELEASE ORAL at 08:48

## 2019-03-21 RX ADMIN — ASPIRIN SCH MG: 81 TABLET, DELAYED RELEASE ORAL at 08:46

## 2019-03-21 RX ADMIN — ENOXAPARIN SODIUM SCH MG: 100 INJECTION SUBCUTANEOUS at 08:48

## 2019-03-21 NOTE — NEUROPROG
Assessment: 


Gifty_1955


  -


Neurology Consult:


-


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


-


Narrative Summary:


3/18/19: Pt had been noting speech problems intermittently since November 2018 

but otherwise no significant medical problems.  On 3/17/19 at 7 am he noted 

right sided weakness that persisted per patient until 2 pm when he came to the 

[facility] ER for evaluation.  At that time he was only noted to have dysarthric 

speech which he indicated has been present since November 2018 and he was 

mildly confused but not focally weak.  Head CT, CTA head/neck, and brain MRI 

wwo all unremarkable.  Neurologic exam showed severe dysarthria, mild cognitive 

problems (thought it was 2018, problems with motor planning), generalized 

intermittent weakness, and possible right proximal focal weakness.  Given 

length of symptoms of weakness (hours) in setting of normal brain MRI makes TIA 

or stroke unlikely.  Pt seems to most likely have a neurodegenerative process 

such as a motor neuron disease affecting bulbar muscles.  Treatment is likely 

symptomatic.  I will check labs for any myasthenia and have pt f/u in neurology 

clinic in 1-2 weeks to obtain EMG/NCS looking for any denervation in the tongue 

with Dr. Adam Rizvi (Neuromuscular expert).


-


F/U 3/19/19.  Given how severely affected the patient is I feel it is 

reasonable to give a 7 day trial of prednisone 80 mg qd to see if this improves 

his symptoms (possibly atypical myasthenia gravis?).  However, if no clear 

improvement after 7 days then I will stop prednisone.  No clinical change in 

patient today.  B12/TSH unremarkable.


-


F/U 3/20/19.  No improvement of dysarthria on steroids.  Pt had cervical MRI 

showing mild to mod degen changes but no clear explanation for his severe 

dysarthria.  No new events.  Pt sitting up in chair.


-


HPI:


F/U 3/21/19.  Still no improvement on steroids.  Neurosurgery did not feel his 

cervical degen disease required any surgical intervention at this time.  No new 

events.  Pt denied new complaints.


-


PMHx: HTN?


-


SHx: retired


FHx: NC


-


ROS: Pt denied acute fever, total vision loss, active severe chest pain, 

respiratory failure, total body severe rash, total bowel/bladder incontinence, 

psychosis, active seizures, or active bleeding


-


Labs:


3/17/19- CBC wnl, coags wnl, Chem Cr 0.6L Gluc 131H, HIV 1 /2 negative


3/18/19- TSH wnl, B12 438


-


Rads:


3/17/19- Head CT wo: no acute bleed


3/17/19- Head/neck CTA: no flow limiting stenosis, no significant findings


3/17/19- Brain MRI wwo: no acute stroke, minimal white matter disease


3/19/19- Cervical MRI wwo: Moderate to severe degenerative disk disease from C4-

5 through C6-C7 with dorsal disk/osteophyte complexes, bilateral uncovertebral 

osteophytes and bilateral facet arthropathy resulting in mild to moderate 

central canal stenosis, worst at C4-C5 and C6-C7, and moderate to severe 

bilateral neural foraminal stenosis.  Mild cord compression without cord edema 

or myelomalacia.  No enhancing lesions.


-


Assessment:


1. 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 

concern for underlying neurodegenerative process such as bulbar variant of 

motor neuron disease.  Will give empiric trial of prednisone 80 mg qd x 7 days (

began on 3/18/19)


-


2. Cervical MRI wwo on 3/19/19 showing moderate to severe degen changes: Not 

likely the cause of his severe dysarthria.  I do not suspect this is a 

symptomatic finding but I will re-consider if he develops neck pain or any new 

symptoms suggesting cord compression (increased tone, change in bowel/bladder 

habits, numbness in arms/legs).  Neurosurgery saw on 3/21/19 and did not 

recommend surgery.


-


Plan:


- Labs: myasthenic panel (pending)


- empiric trial of prednisone 80 mg qd x 7 days (began on 3/18/19), if no clear 

improvement in 7 days then would stop steroids


- Agree with PT/OT/Speech


- F/U in neurology clinic in 1-2 weeks for EMG/NCS with Dr. Rizvi to check 

for any denervation in tongue





Objective: 





 Vital Signs











Temp Pulse Resp BP Pulse Ox


 


 36.7 C   97   16   163/92 H  93 


 


 03/21/19 07:24  03/21/19 08:48  03/21/19 07:24  03/21/19 08:48  03/21/19 07:24








 











 03/20/19 03/21/19 03/22/19





 05:59 05:59 05:59


 


Intake Total 250 600 


 


Output Total 1300 980 


 


Balance -1050 -380 








 











PT  12.6 SEC (12.0-15.0)   03/17/19  15:52    


 


INR  0.98  (0.83-1.16)   03/17/19  15:52    











Allergies/Adverse Reactions: 


 





Penicillins Allergy (Verified 03/17/19 16:17)

## 2019-03-21 NOTE — NEUSURGPN
Assessment/Plan: 





65y/o male with diffuse weakness, worse in BUE and RLE with C5-7 degenerative 

changes





-Discussed with patient that his cervical spine MRI findings do not explain his 

weakness and we would not recommend surgery at this time for this


-Continue PT/OT


-Will s/o off at this time as no acute neurosurgical intervention


-Discussed with Dr Robb


-Please notify NS with any change in neuro/motor exam


Subjective: 


BUE weakness the same as yesterday. Not complain of new pain down BUE


Objective: 


NAD A&Ox3


BUE diffusely 4-/5 throughout,except deltoids 3/5 bilaterally


RLE DF/EHL 2/5, 5-/5 throughout otherwise





- Physician


Discussed Patient with : Cezar





Neurosurgery Physical Exam





- Vitals, I&O, Labs





 I and O











 03/20/19 03/21/19 03/22/19





 05:59 05:59 05:59


 


Intake Total 250 600 


 


Output Total 1300 980 


 


Balance -1050 -380 


 


Intake:   


 


  Oral (ml) 250 600 


 


Output:   


 


  Urine (ml) 1300 980 


 


    Urinal 1300 980 


 


Other:   


 


  Intake Quantity  Yes 





  Sufficient   


 


  Number of Voids   


 


    Incontinence  1 


 


    Urinal 1 1 


 


  Number of Stools   


 


    Toilet 1 1 








 Vital Signs











Temp Pulse Resp BP Pulse Ox


 


 36.7 C   99   16   164/94 H  93 


 


 03/21/19 07:24  03/21/19 07:24  03/21/19 07:24  03/21/19 07:24  03/21/19 07:24














ICD10 Worksheet


Patient Problems: 


 Problems











Problem Status Onset


 


Aphasic disturbance Acute  


 


Weakness Acute

## 2019-03-21 NOTE — HOSPPROG
Hospitalist Progress Note


Assessment/Plan: 





63 yo M w dysphagia, thrush, generalized weakness and possible visual field 

cut. no stroke on MRI





neuro: primary differential is myasthenia vs progressive supranuclear palsy


   d/w neuro, trial of steroids day 3/8


   MG labs pending





   slightly improved today


   follow daily


   await MG labs


   


thrush: HIV neg


   treated





dysphagia: nectar thick liquids


   ongoing SLP





weakness: likely related to #1





proph :lmwh





?stroke: no





weakness: c spine mri w no cause





dispo: inpt


   will need inpt  rehab


Subjective: more alert, stronger, sitting up in chair


Objective: 


 Vital Signs











Temp Pulse Resp BP Pulse Ox


 


 36.9 C   87   11 L  144/97 H  92 


 


 03/21/19 15:28  03/21/19 15:28  03/21/19 15:28  03/21/19 15:28  03/21/19 15:28








 











 03/20/19 03/21/19 03/22/19





 05:59 05:59 05:59


 


Intake Total 250 600 240


 


Output Total 1300 980 225


 


Balance -1050 -380 15








 











PT  12.6 SEC (12.0-15.0)   03/17/19  15:52    


 


INR  0.98  (0.83-1.16)   03/17/19  15:52    














- Physical Exam


Constitutional: no apparent distress, appears nourished


Eyes: PERRL, anicteric sclera


Ears, Nose, Mouth, Throat: moist mucous membranes, hearing normal


Cardiovascular: regular rate and rhythym, no murmur, rub, or gallop


Respiratory: no respiratory distress, no rales or rhonchi


Gastrointestinal: normoactive bowel sounds, soft, non-tender abdomen


Genitourinary: no bladder fullness, No power in urethra


Skin: warm, normal color


Musculoskeletal: no muscle tenderness


Neurologic: other (dystarthric)





ICD10 Worksheet


Patient Problems: 


 Problems











Problem Status Onset


 


Aphasic disturbance Acute  


 


Weakness Acute

## 2019-03-22 RX ADMIN — ENOXAPARIN SODIUM SCH MG: 100 INJECTION SUBCUTANEOUS at 09:52

## 2019-03-22 RX ADMIN — ASPIRIN SCH MG: 81 TABLET, DELAYED RELEASE ORAL at 09:53

## 2019-03-22 RX ADMIN — METOPROLOL SUCCINATE SCH MG: 50 TABLET, EXTENDED RELEASE ORAL at 09:52

## 2019-03-22 NOTE — ASMTCMCOM
CM Note

 

CM Note                       

Notes:

Pts case discussed w/ Dr. Castanon and Kalyani Le. Kalyani will continue to follow this case to 

see if pt is appropriate for inpatient rehab. CM met w/ pt for dispo planning. CM told pt that he 

has been accepted to [facility] and inpatient rehab is still seeing if he qualifies. Pt reports 

that it is not an option for his friend from [place] to come up to stay w/ him. Pt reports that his 

neighbor Peter that lives next door is his only support. Pt agreed for CM to call Peter. CM spoke to 

Peter on the phone and he said that he would be able to check in once in awhile but cannot be there 

daily nor can he provide 24/7 supervision. CM to follow.







Plan: TBD

 

Date Signed:  03/22/2019 03:25 PM

Electronically Signed By:FATUMA Kraft

## 2019-03-22 NOTE — NEUROPROG
Assessment: 


Gifty_1955


  -


Neurology Consult:


-


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


-


Narrative Summary:


3/18/19: Pt had been noting speech problems intermittently since November 2018 

but otherwise no significant medical problems.  On 3/17/19 at 7 am he noted 

right sided weakness that persisted per patient until 2 pm when he came to the 

[facility] ER for evaluation.  At that time he was only noted to have dysarthric 

speech which he indicated has been present since November 2018 and he was 

mildly confused but not focally weak.  Head CT, CTA head/neck, and brain MRI 

wwo all unremarkable.  Neurologic exam showed severe dysarthria, mild cognitive 

problems (thought it was 2018, problems with motor planning), generalized 

intermittent weakness, and possible right proximal focal weakness.  Given 

length of symptoms of weakness (hours) in setting of normal brain MRI makes TIA 

or stroke unlikely.  Pt seems to most likely have a neurodegenerative process 

such as a motor neuron disease affecting bulbar muscles.  Treatment is likely 

symptomatic.  I will check labs for any myasthenia and have pt f/u in neurology 

clinic in 1-2 weeks to obtain EMG/NCS looking for any denervation in the tongue 

with Dr. Adam Rizvi (Neuromuscular expert).


-


F/U 3/19/19.  Given how severely affected the patient is I feel it is 

reasonable to give a 7 day trial of prednisone 80 mg qd to see if this improves 

his symptoms (possibly atypical myasthenia gravis?).  However, if no clear 

improvement after 7 days then I will stop prednisone.  No clinical change in 

patient today.  B12/TSH unremarkable.


-


F/U 3/20/19.  No improvement of dysarthria on steroids.  Pt had cervical MRI 

showing mild to mod degen changes but no clear explanation for his severe 

dysarthria.  No new events.  Pt sitting up in chair.


-


F/U 3/21/19.  Still no improvement on steroids.  Neurosurgery did not feel his 

cervical degen disease required any surgical intervention at this time.  No new 

events.  Pt denied new complaints.


-


HPI:


F/U 3/22/19.  Still no improvement on steroids.  If no dramatic improvement 

after completing 7 days of steroids then I would discontinue.  No new events.  

No further neurologic w/u needed so neurology will sign off.  He should call 

our clinic at hospital discharge to set up an EMG/NCS with Dr. Rizvi as the 

next step in his evaluation process.


-


PMHx: HTN?


-


SHx: retired


FHx: NC


-


ROS: Pt denied acute fever, total vision loss, active severe chest pain, 

respiratory failure, total body severe rash, total bowel/bladder incontinence, 

psychosis, active seizures, or active bleeding


-


Labs:


3/17/19- CBC wnl, coags wnl, Chem Cr 0.6L Gluc 131H, HIV 1 /2 negative


3/18/19- TSH wnl, B12 438


-


Rads:


3/17/19- Head CT wo: no acute bleed


3/17/19- Head/neck CTA: no flow limiting stenosis, no significant findings


3/17/19- Brain MRI wwo: no acute stroke, minimal white matter disease


3/19/19- Cervical MRI wwo: Moderate to severe degenerative disk disease from C4-

5 through C6-C7 with dorsal disk/osteophyte complexes, bilateral uncovertebral 

osteophytes and bilateral facet arthropathy resulting in mild to moderate 

central canal stenosis, worst at C4-C5 and C6-C7, and moderate to severe 

bilateral neural foraminal stenosis.  Mild cord compression without cord edema 

or myelomalacia.  No enhancing lesions.


-


Assessment:


1. 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 

concern for underlying neurodegenerative process such as bulbar variant of 

motor neuron disease.  Will give empiric trial of prednisone 80 mg qd x 7 days (

began on 3/18/19)


-


2. Cervical MRI wwo on 3/19/19 showing moderate to severe degen changes: Not 

likely the cause of his severe dysarthria.  I do not suspect this is a 

symptomatic finding but I will re-consider if he develops neck pain or any new 

symptoms suggesting cord compression (increased tone, change in bowel/bladder 

habits, numbness in arms/legs).  Neurosurgery saw on 3/21/19 and did not 

recommend surgery.


-


Plan:


- Labs: myasthenic panel (pending)


- empiric trial of prednisone 80 mg qd x 7 days (began on 3/18/19), if no clear 

improvement in 7 days then would stop steroids


- Agree with PT/OT/Speech, likely will need long term care placement


- F/U in neurology clinic in 1-2 weeks for EMG/NCS with Dr. Rizvi to check 

for any denervation in tongue


- No further neurologic inpt w/u needed, neurology will sign off





Objective: 





 Vital Signs











Temp Pulse Resp BP Pulse Ox


 


 36.7 C   81   21 H  153/95 H  90 L


 


 03/22/19 11:02  03/22/19 11:02  03/22/19 11:02  03/22/19 11:02  03/22/19 11:02








 











 03/21/19 03/22/19 03/23/19





 05:59 05:59 05:59


 


Intake Total 600 1540 570


 


Output Total 980 225 250


 


Balance -380 1315 320








 











PT  12.6 SEC (12.0-15.0)   03/17/19  15:52    


 


INR  0.98  (0.83-1.16)   03/17/19  15:52    











Allergies/Adverse Reactions: 


 





Penicillins Allergy (Verified 03/17/19 16:17)

## 2019-03-22 NOTE — HOSPPROG
Hospitalist Progress Note


Assessment/Plan: 





63 yo M w dysphagia, thrush, generalized weakness and possible visual field 

cut. no stroke on MRI





neuro: primary differential is myasthenia vs progressive supranuclear palsy


   d/w neuro, trial of steroids day 5/8


   MG labs pending





   slightly improved today


   follow daily


   await MG labs- these will not be back until 4/2





   making considerable progress over last 2 days


      this is encouraging 


   


   


thrush: HIV neg


   treated





dysphagia: nectar thick liquids


   ongoing SLP





weakness: likely related to #1





proph :lmwh





?stroke: no





weakness: c spine mri w no cause





dispo: inpt


   will need inpt  rehab





   i have discussed this w yessenia and rick baker (RN leadership at inpt rehab

) advocating for placement at inpt rehab. i believe he will thrive and improve 

there


Subjective: case d/w dr stock


Objective: 


 Vital Signs











Temp Pulse Resp BP Pulse Ox


 


 36.7 C   81   21 H  153/95 H  90 L


 


 03/22/19 11:02  03/22/19 11:02  03/22/19 11:02  03/22/19 11:02  03/22/19 11:02








 











 03/21/19 03/22/19 03/23/19





 05:59 05:59 05:59


 


Intake Total 600 1540 1250


 


Output Total 980 225 600


 


Balance -380 1315 650








 











PT  12.6 SEC (12.0-15.0)   03/17/19  15:52    


 


INR  0.98  (0.83-1.16)   03/17/19  15:52    














- Physical Exam


Constitutional: no apparent distress, appears nourished


Eyes: PERRL, anicteric sclera


Ears, Nose, Mouth, Throat: moist mucous membranes, hearing normal


Cardiovascular: regular rate and rhythym, no murmur, rub, or gallop


Respiratory: no respiratory distress, no rales or rhonchi


Gastrointestinal: normoactive bowel sounds, soft, non-tender abdomen


Genitourinary: no bladder fullness, No power in urethra


Skin: warm, normal color


Neurologic: other ( strength now 4/5 (2/5 on admit) voice stronger w 

considerably less dysarthria)





ICD10 Worksheet


Patient Problems: 


 Problems











Problem Status Onset


 


Aphasic disturbance Acute  


 


Weakness Acute

## 2019-03-23 RX ADMIN — METOPROLOL SUCCINATE SCH MG: 50 TABLET, EXTENDED RELEASE ORAL at 09:18

## 2019-03-23 RX ADMIN — ASPIRIN SCH MG: 81 TABLET, DELAYED RELEASE ORAL at 09:27

## 2019-03-23 RX ADMIN — ENOXAPARIN SODIUM SCH MG: 100 INJECTION SUBCUTANEOUS at 09:27

## 2019-03-23 NOTE — HOSPPROG
Hospitalist Progress Note


Assessment/Plan: 





Subjective


Follow-up on dysarthria.  No acute events overnight.  Patient states that he 

feels he is getting much better guards to his speech as well as his strength.  

He demonstrates lifting his arms above the level of the shoulders which he 

states he could not do when he 1st came in.  We reviewed that he is currently 

on steroids with the running diagnosis of myasthenia gravis versus progressive 

supranuclear palsy.  No complaints of shortness of breath.





Objective


Vital signs as detailed below


Physical exam


General-awake alert conversant no acute distress, sitting in chair at the 

bedside


Heart-regular rate and rhythm no murmurs


Lungs-Clear to auscultation with normal respiratory effort


Abdomen-soft nontender nondistended normal bowel sounds


-no Tai catheter in place


Extremities-no significant pitting edema or calf pain with palpation


Skin-no concerning skin rashes noted


Neuro-patient is a able to articulate but speech is certainly still impacted, 

he is able to lift his hands above the level of his shoulders which is left arm 

appears more weak as compared to the right.





Labs as detailed below





Assessment and plan





Myasthenia gravis versus progressive supranuclear palsy-patient appears to be 

improving.  Studies for myasthenia are currently pending and not expected until 

the 1st week of April.  Considering the improvement with steroids I will 

anticipate continuing.  Tomorrow will be day 7 of prednisone at 80 mg daily.





Thrush-appears to be resolved.





Dysphagia-patient is on nectar liquids.





Hypertension-patient was on metoprolol as an outpatient.  Uncertain compliance.

  Blood pressures appear reasonably well controlled.





DVT prophylaxis-





Disposition-plan will be for [facility] once ready for discharge.  Potentially 

could discharge in the coming days to continue prednisone.


Objective: 


 Vital Signs











Temp Pulse Resp BP Pulse Ox


 


 36.6 C   69   20   152/98 H  91 L


 


 03/23/19 15:34  03/23/19 15:34  03/23/19 15:34  03/23/19 15:34  03/23/19 15:34








 Microbiology











 03/17/19 19:53 Blood Culture - Final





 Blood 


 


 03/17/19 20:03 Blood Culture - Final





 Blood 








 











 03/22/19 03/23/19 03/24/19





 05:59 05:59 05:59


 


Intake Total 1540 1750 


 


Output Total 225 1325 250


 


Balance 1315 425 -250








 











PT  12.6 SEC (12.0-15.0)   03/17/19  15:52    


 


INR  0.98  (0.83-1.16)   03/17/19  15:52    














ICD10 Worksheet


Patient Problems: 


 Problems











Problem Status Onset


 


Aphasic disturbance Acute  


 


Weakness Acute

## 2019-03-24 RX ADMIN — METOPROLOL SUCCINATE SCH MG: 50 TABLET, EXTENDED RELEASE ORAL at 10:05

## 2019-03-24 RX ADMIN — ASPIRIN SCH MG: 81 TABLET, DELAYED RELEASE ORAL at 10:05

## 2019-03-24 RX ADMIN — ENOXAPARIN SODIUM SCH MG: 100 INJECTION SUBCUTANEOUS at 10:06

## 2019-03-24 NOTE — HOSPPROG
Hospitalist Progress Note


Assessment/Plan: 





Subjective


Follow-up on dysarthria.  No acute events overnight.  Patient states that his 

strength seems to be better but no improvement of his dysarthria.  Case 

reviewed with Neurology today.  We discussed stopping steroids unless the 

dysarthria seems to improve.





Objective


Vital signs as detailed below


Physical exam


General-awake alert conversant no acute distress,


Heart-regular rate and rhythm no murmurs


Lungs-Clear to auscultation with normal respiratory effort


Abdomen-soft nontender nondistended normal bowel sounds


-no Tai catheter in place


Extremities-no significant pitting edema or calf pain with palpation


Skin-no concerning skin rashes noted


Neuro-patient is a able to articulate but speech is certainly still impacted, 

he is able to lift his hands above the level of his shoulders which is left arm 

appears more weak as compared to the right.





Labs as detailed below





Assessment and plan





Myasthenia gravis versus progressive supranuclear palsy-concern also for ALS 

variant.  patient appears to be improving in regards to strength but speech is 

unchanged.  Studies for myasthenia are currently pending and not expected until 

the 1st week of April.  Will plan on stopping steroids likely after tomorrow's 

dose.  Otherwise outpatient consultation with Neurology after discharge.





Dysphagia-patient is on nectar liquids.





Hypertension-patient was on metoprolol as an outpatient.  Uncertain compliance.

  Blood pressures appear mildly elevated over the past 24 hr.  Possibly effect 

from steroids.  Will not make any changes for now as stopping tomorrow.





DVT prophylaxis-Lovenox.





Disposition-plan will be for [facility] once ready for discharge. 


Objective: 


 Vital Signs











Temp Pulse Resp BP Pulse Ox


 


 36.9 C   63   17   137/80 H  94 


 


 03/24/19 15:42  03/24/19 15:42  03/24/19 15:42  03/24/19 15:42  03/24/19 15:42








 











 03/23/19 03/24/19 03/25/19





 05:59 05:59 05:59


 


Intake Total 1750 200 640


 


Output Total 1325 1050 500


 


Balance 425 -850 140








 











PT  12.6 SEC (12.0-15.0)   03/17/19  15:52    


 


INR  0.98  (0.83-1.16)   03/17/19  15:52    














ICD10 Worksheet


Patient Problems: 


 Problems











Problem Status Onset


 


Aphasic disturbance Acute  


 


Weakness Acute

## 2019-03-25 ENCOUNTER — HOSPITAL ENCOUNTER (INPATIENT)
Dept: HOSPITAL 80 - BREH | Age: 64
LOS: 11 days | Discharge: HOME HEALTH | DRG: 92 | End: 2019-04-05
Attending: INTERNAL MEDICINE | Admitting: INTERNAL MEDICINE
Payer: COMMERCIAL

## 2019-03-25 VITALS — DIASTOLIC BLOOD PRESSURE: 93 MMHG | SYSTOLIC BLOOD PRESSURE: 161 MMHG

## 2019-03-25 DIAGNOSIS — R47.1: Primary | ICD-10-CM

## 2019-03-25 DIAGNOSIS — M50.322: ICD-10-CM

## 2019-03-25 DIAGNOSIS — R13.10: ICD-10-CM

## 2019-03-25 DIAGNOSIS — M50.321: ICD-10-CM

## 2019-03-25 DIAGNOSIS — M48.02: ICD-10-CM

## 2019-03-25 DIAGNOSIS — G81.91: ICD-10-CM

## 2019-03-25 DIAGNOSIS — M51.36: ICD-10-CM

## 2019-03-25 DIAGNOSIS — I10: ICD-10-CM

## 2019-03-25 DIAGNOSIS — R32: ICD-10-CM

## 2019-03-25 PROCEDURE — F0636ZZ COMMUNICATIVE/COGNITIVE INTEGRATION SKILLS TREATMENT OF NEUROLOGICAL SYSTEM - WHOLE BODY: ICD-10-PCS | Performed by: INTERNAL MEDICINE

## 2019-03-25 PROCEDURE — F07M3ZZ MOTOR FUNCTION TREATMENT OF MUSCULOSKELETAL SYSTEM - WHOLE BODY: ICD-10-PCS | Performed by: INTERNAL MEDICINE

## 2019-03-25 PROCEDURE — F08Z7ZZ VOCATIONAL ACTIVITIES AND FUNCTIONAL COMMUNITY OR WORK REINTEGRATION SKILLS TREATMENT: ICD-10-PCS | Performed by: INTERNAL MEDICINE

## 2019-03-25 RX ADMIN — ENOXAPARIN SODIUM SCH MG: 100 INJECTION SUBCUTANEOUS at 10:16

## 2019-03-25 RX ADMIN — METOPROLOL SUCCINATE SCH MG: 50 TABLET, EXTENDED RELEASE ORAL at 10:16

## 2019-03-25 RX ADMIN — ASPIRIN SCH MG: 81 TABLET, DELAYED RELEASE ORAL at 10:16

## 2019-03-25 RX ADMIN — STANDARDIZED SENNA CONCENTRATE SCH TAB: 8.6 TABLET ORAL at 20:49

## 2019-03-25 NOTE — ASMTCMCOM
CM Note

 

CM Note                       

Notes:

Pt medically stable for d/c to [facility] inpatient rehab, orders to be obtained via [employer]. Pt agrees 

to pay for [employer] transport. GERRY Judge to call report. 

 

Date Signed:  03/25/2019 03:39 PM

Electronically Signed By:LUKAS Colon

## 2019-03-25 NOTE — ASMTLACE
LACE

 

Length of stay for            Answers:  7-13 days                             

current admission                                                             

Acuity / Level of             Answers:  Yes                                   

Care: Did the patient                                                         

have an inpatient                                                             

admission?                                                                    

Comorbidities - select        Answers:  Cerebrovascular disease               

all that apply                          (CVA, TIA, aneurysms, vasc            

                                        ular dementia)                        

                                        Other                         Notes:  Aphasia, HTN

# of Emergency department     Answers:  1-2                                   

visits in the last 6                                                          

months                                                                        

Score: 11

 

Date Signed:  03/25/2019 03:38 PM

Electronically Signed By:LUKAS Colon

## 2019-03-25 NOTE — GDS
[f rep st]



                                                             DISCHARGE SUMMARY





PRIMARY CARE PROVIDER:  None presently established.



DISCHARGE DIAGNOSIS:  Probable motor neuron disease.



HISTORY OF PRESENT ILLNESS:  The patient is a pleasant 64-year-old gentleman with a past medical hist
ory of hypertension, who presented to [facility] on 03/17/2019, with complaints of sl
urred speech and right upper extremity weakness.  A stroke workup was initiated, which included a CT 
scan of the head, which did not show any acute intracranial findings.  He, furthermore, had a CT nathanael
ography of the head and neck, which showed normal intracranial arterial circulation and no occlusion,
 dissection, or stenosis in the neck.  An MRI of the brain was performed, which did not show any acut
e ischemia, or intracranial hemorrhage.  Considerations were given toward myasthenia gravis, as well 
as progressive supranuclear palsy.  In the end, with consultation with Neurology, it was felt that he
 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
S.  He was started on an empiric course of steroids with Solu-Medrol 80 mg IV daily.  However, unfort
unately, no improvement with his speech changes occurred and these were ultimately stopped on 03/24/2
019.



HOSPITAL COURSE BY PROBLEM:  Motor neuron disease, probable:  Steroids have been stopped as no positi
ve effect on his symptoms of dysarthria.  In retrospect, it sounds like these have been chronic peralta
es developing initially in the fall of 2018.  Primary concern is for motor neuron disease [facility] for further evaluation.  A followup with Dr. Adam Rizvi i
n 1 to 2 weeks for EMG/NCS is recommended at the time of discharge. 



Dysphagia:  The patient was evaluated by Speech Therapy during this hospitalization and placed on a d
ysphagia diet. 



Constipation:  Bowel regimen was put into place today as patient reported his last bowel movement was
 3 days prior.  He does report that he had a bowel movement today on the day of discharge. 



Hypertension, uncontrolled:  The patient has been on metoprolol with inadequate control.  Amlodipine 
2.5 mg daily was added during this hospitalization. 



DVT prophylaxis:  The patient was on Lovenox during this hospitalization.



DISPOSITION:  The patient was accepted to inpatient rehab at [facility] and will transition there today.



DISCHARGE PHYSICAL EXAMINATION:  VITAL SIGNS:  On day of discharge, temperature 37.0, blood pressure 
156/90, heart rate 61, respirations 17, satting 95% on 2 L nasal cannula.  GENERAL:  The patient appe
ars comfortable he is awake, alert, interactive, no acute distress.  HEART:  Regular rate and rhythm.
  No murmurs.  LUNGS:  Clear on auscultation with normal respiratory effort.  ABDOMEN:  Soft, nontend
er and nondistended.  Normal bowel sounds.  :  No Tai catheter in place.  EXTREMITIES:  No signif
icant pitting edema or calf pain with palpation.  SKIN:  No concerning skin rashes noted.  NEURO:  Th
e patient is able to articulate speech, but is certainly still impacted by his underlying disease pro
cess.  He is able to lift his hands above the level of the shoulders.  No difference in strength note
d between the right and left extremities.



NOTABLE STUDIES:  White blood cell count 7.4, hemoglobin 17, platelets 267.  INR 0.98.  Sodium 139, p
otassium 4.3, chloride 99, bicarb 19, BUN 22, creatinine 0.6, glucose 126.  Myasthenia antibody studi
es still pending and expected to be back the first week of April.



DISCHARGE MEDICATIONS:  

1.  Acetaminophen 650 mg every 4 hours as needed for pain or fever.

2.  Amlodipine 2.5 mg daily.

3.  Lovenox 40 mg subcutaneous daily.

4.  MiraLAX 17 g daily.

5.  Senokot 1 tab nightly.

6.  Metoprolol succinate 50 mg daily.

7.  Aspirin 81 mg daily.  



As-needed medications include:  

1.  Magnesium citrate.

2.  Zofran.



DISCHARGE INSTRUCTIONS:  The patient will transition to inpatient rehab at [facility]
 on the [facility].  A followup visit with Dr. Adam Rizvi is recommended in 1 to 2 weeks' ti
me. 



Forty minutes of time dedicated to discharge efforts.  I appreciate case management's assistance with
 the discharge today.





Job #:  [id number]

## 2019-03-25 NOTE — ASDISCHSUM
----------------------------------------------

Discharge Information

----------------------------------------------

Plan Status:Inpatient Rehab                          Medically Cleared to Leave:

Discharge Date:2019 03:34 PM                   CM D/C Disposition:

ADT D/C Disposition:[facility]                Projected Discharge Date:2019 11:00 AM

Transportation at D/C:                               Discharge Delay Reason:

Follow-Up Date:2019 11:00 AM                   Discharge Slot:

Final Diagnosis:

----------------------------------------------

Placement Information

----------------------------------------------

Referral Type:*Nursing Home/SNF                      Referral ID:[id number]

Provider Name:

Address 1:                                           Phone Number:

Address 2:                                           Fax Number:

City:                                                Selection Factors:

State:

 

Referral Type:Rehabilitation Hospital                Referral ID:[id number]

Provider Name:[facility]

Address 1:[address] [place] [address]                            Phone Number:

Address 2:                                           Fax Number:

[facility]:[place]                                         Selection Factors:

State:[place]

 

----------------------------------------------

Patient Contact Information

----------------------------------------------

Contact Name:YADY HIGGINBOTHAM                       Relationship:Friend

Address:[address] [place] [address]                                 Home [phone]

                                                     Work Phone:

City:[place]                                         Alternate Phone:

State/Zip Code:[place] [address]                              Email:

----------------------------------------------

Financial Information

----------------------------------------------

Financial Class:Medicare

Primary Plan Desc:MEDICARE INPATIENT                 Primary Plan Number:[id number]

Secondary Plan Desc:                                 Secondary Plan Number:

 

 

----------------------------------------------

Assessment Information

----------------------------------------------

----------------------------------------------

LACE

----------------------------------------------

LACE

 

Length of stay for            Answers:  7-13 days                             

current admission                                                             

Acuity / Level of             Answers:  Yes                                   

Care: Did the patient                                                         

have an inpatient                                                             

admission?                                                                    

Comorbidities - select        Answers:  Cerebrovascular disease               

all that apply                          (CVA, TIA, aneurysms, vasc            

                                        ular dementia)                        

                                        Other                         Notes:  Aphasia, HTN

# of Emergency department     Answers:  1-2                                   

visits in the last 6                                                          

months                                                                        

Score: 11

 

Date Signed:  2019 03:38 PM

Electronically Signed By:LUKAS Colon

 

 

----------------------------------------------

[facility] CM Progress Note

----------------------------------------------

CM Note

 

CM Note                       

Notes:

Reviewed chart. Pt presented to the Emergency Department via EMS with aphasia and right sided 

weakness - stroke alert. History includes Hepatitis C, back injury, HTN and flat foot. Pt is single 


and lives alone in [place]. Met with pt to assist in locating emergency contacts. Pt requested CM 

contact Peterramon Centeno, pt's neighbor. Peter located via online white pages. Call placed to Timoteo Centeno [phone], [address] [facility]. Peter was aware the pt had contacted EMS and was 

transported to [facility]. Peter has been Rudy's neighbor for quite some time, but states that "Rudy is 

very reclusive, private and doesn't share much." Peter reports "Rudy travels to [place], [place] a 


couple of times per year," but Peter is unclear what he does when he is there. Petre states that he 

and his wife noticed the pt "had increased weakness and difficulty with speech back in November 

when he returned from a trip to [facility]." Peter and Oralia will come see the pt tomorrow in the 

hospital. Update provided to Rudy.



Rudy requested CM contact Ismael Bennett [phone], his friend from [place]. Call placed to 

Ismael. Ismael reports knowing the pt for many years. Ismael states the pt does not have any living 

family - the pt's brother  in . Ismael reports the pt has Hepatitis C, flat foot secondary to 


a back injury in the  and HTN. Ismael states the pt last visited [place] the end of 

October, early 2018. At that time Ismael noted the pt had slurred, slow speech and increased 


weakness. Ismael states the pt "self adjusted his anti-hypertensive medications in September or 

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 Ismael will keep in touch with the pt. Main hospital number provided. Update 

provided to Rudy.



Pt to be admitted for further evaluation and treatment. Discharge needs remain unclear at this 

time. Will await PT/OT evals. CM will continue to follow.







Discharge Plan: To be determined

 

Date Signed:  2019 06:32 PM

Electronically Signed By:Bailee Augustine RN

 

 

----------------------------------------------

[facility] Progress Note

----------------------------------------------

CM Note

 

CM Note                       

Notes:

Pts case discussed w/ Dr. Castanon. Therapies all continue to recommend inpatient rehab. CM spoke to 

Kalyani Le about this pt and she reports that they will most likely not be able to take him. CM 


met w/ pt. Pt is agreeable to going to SNF. CM provided him w/ senior blue book. Pt chose [facility] because he wanted to stay in [place]. Referral sent and non triggering pasrr completed. CM to 

follow.







Plan: Inpatient rehab vs SNF/[facility]

 

Date Signed:  2019 04:22 PM

Electronically Signed By:FATUMA Kraft

 

 

----------------------------------------------

[facility] ANA Progress Note

----------------------------------------------

ANA Note

 

CM Note                       

Notes:

Pts case discussed w/ Dr. Castanon and Kalyani Le. Kalyani will continue to follow this case to 

see if pt is appropriate for inpatient rehab. CM met w/ pt for dispo planning. CM told pt that he 

has been accepted to [facility] and inpatient rehab is still seeing if he qualifies. Pt reports 

that it is not an option for his friend from [place] to come up to stay w/ him. Pt reports that his 

neighbor Peter that lives next door is his only support. Pt agreed for CM to call Peter. CM spoke to 

Peter on the phone and he said that he would be able to check in once in awhile but cannot be there 

daily nor can he provide  supervision. CM to follow.







Plan: TBD

 

Date Signed:  2019 03:25 PM

Electronically Signed By:FATUMA Kraft

 

 

----------------------------------------------

[facility] CM Progress Note

----------------------------------------------

CM Note

 

CM Note                       

Notes:

Pt medically stable for d/c to [facility] inpatient rehab, orders to be obtained via [employer]. Pt agrees 

to pay for [employer]. GERRY Judge to call report. 

 

Date Signed:  2019 03:39 PM

Electronically Signed By:LUKAS Colon

 

 

----------------------------------------------

Intervention Information

----------------------------------------------

Intervention Type:*Incorrect Registration            Date of Service:2019 08:05 AM

Patient Type:Inpatient                               Staff Member:GERRY Coburn, Christina

Hours:                                               Discipline:

Severity:                                            Comment:

Intervention Type:*IM-Signed                         Date of Service:2019 03:10 PM

Patient Type:Inpatient                               Staff Member:Betty Lou

Hours:                                               Discipline:

Severity:                                            Comment:

## 2019-03-25 NOTE — PDIAF
- Diagnosis


Diagnosis: Dysphagia, dysarthria


Code Status: Full Code





- Medication Management


Discharge Medications: electronically signed and located in the Home Medication 

List.





- Orders


Services needed: Physical Therapy, Occupational Therapy, Speech Language 

Pathologist


Diet Recommendation: no restrictions on diet


Diet Texture: Dysphagia 1 - Pureed, Nectar Thick Liquids





- Follow Up Care


Current Providers and Referrals: 


Patient,NotPresent [Unknown] - As per Instructions

## 2019-03-25 NOTE — PDOREHIP
Admission [facility]





- Admission - 3 Day Assessment Period


Admission Date/Day 1: 03/25/19


Day 2: 03/26/19


Day 3: 03/27/19





- Active Diagnoses


Comorbidities and Co-existing Conditions at Admission: [address]. None of the Above





- Skin Conditions


Unhealed Pressure Ulcer (1 or more/Stage 1 or >)-Admission: 0. No


# Stage 1 Pressure Ulcers-Admission: 0


# Stage 2 Pressure Ulcers-Admission: 0


# Stage 3 Pressure Ulcers-Admission: 0


# Stage 4 Pressure Ulcers-Admission: 0


# Unstageable Pressure Ulcers (Non-remove Dress)-Admission: 0


# Unstageable Pressure Ulcers (Slough/Eschar)-Admission: 0


# Unstageable Pressure Ulcers (Deep Tissue Injury)-Admission: 0

## 2019-03-25 NOTE — GHP
[f 
rep st]



                                                            HISTORY AND PHYSICAL





POST ADMISSION PHYSICIAN EVALUATION AND REHABILITATION TREATMENT PLAN



DATE OF ADMISSION:  03/25/2019



DATE OF EVALUATION:  March 25, 2019.



TIME OF EVALUATION:  1615.



REFERRING FACILITY:  [facility].



REFERRING PHYSICIAN:  Dr. Castanon



IMPAIRMENT GROUP:  3.9.



DATE OF ONSET:  03/17/2019.



REFERRING PHYSICIAN:  Dr. Castanon



CONSULTING PHYSICIANS:  Neurology, Dr. Malave.  Neurosurgery, Dr. Robb.



REHABILITATION DIAGNOSIS:  Neurodegenerative disorder with dysarthria and 
dysphagia.



ETIOLOGIC DIAGNOSIS:  Other neurologic conditions.



HISTORY OF PRESENT ILLNESS:  This man was [facility] to [facility] on 03/17/2019 with dysarthria and tachycardia.  He was evaluated for a 
strok.  Head CT and head and neck CT angiogram were negative, and his onset of 
symptoms was too remote to consider tPA thrombolysis.  He subsequently had a 
brain MRI, which showed minimal nonspecific periventricular and subcortical 
white matter disease in the frontal and parietal lobes.  Neurosurgery 
consultant advised that his symptoms were not due to cervical spine disease, 
though brain MRI showed moderate to severe degenerative disk disease from C4-5 
through C6-7 with mild to moderate central canal stenosis, worst at C4-5 and C6-
7, and moderate to severe bilateral neural foraminal stenosis.  There was mild 
cord compression without cord edema or myelomalacia.  



Out of concern for possible myasthenia gravis, he was treated with high-dose IV 
corticosteroids.  He had some improvement, but nothing dramatic, and the plan 
is for him to follow up with Neurology after his rehabilitation stay for nerve 
conduction studies.  He continued to have significant dysphagia and was 
maintained on a pureed diet and nectar thick liquids.



LABS AND STUDIES DURING HIS STAY:  CBC was overall normal on the day of 
admission.  PT and PTT were normal.  Serum chemistry showed a significant anion 
gap of 19 when he presented, but this subsequently normalized.  Troponins were 
negative.  Lipid panel was fairly benign with a cholesterol of 184 and an LDL 
slightly elevated at 109, HDL was 58.  TSH was normal.  B12 was normal.  
Fasting cortisol was normal.  Urinalysis showed an elevated specific gravity of 
1.034 and 2+ ketones, but otherwise was normal.  Toxicology screen in the urine 
was negative for any substances of abuse, and the serum was negative for ethyl 
alcohol.  HIV was negative.  Labs regarding myasthenia gravis are still 
pending.  Echocardiogram was done which showed normal to hyperdynamic left 
ventricular systolic function with an ejection fraction of 70% and normal wall 
motion, mild concentric LVH with mild diastolic dysfunction, trivial tricuspid 
regurgitation.  No cardiac thrombus and no intracardiac shunt.



PRECAUTIONS:  He is a fall risk.  He has aspiration precautions.



ACTIVE COMORBIDITIES:  He has the tier 2 comorbidity of dysphagia.  He has the 
tier 3 comorbidity of hemiparesis with right-sided weakness.



PAST MEDICAL HISTORY:  

1.  Lumbar spine degenerative disease.  

2.  Hypertension.



PAST SURGICAL HISTORY:  He has had low back surgery in 1989.



PRE-HOSPITAL MEDICATIONS:  He was taking metoprolol.



ADMISSION MEDICATIONS:  

1.  Acetaminophen 650 mg p.o. q.4 hours p.r.n.  

2.  Amlodipine 2.5 mg p.o. daily.  

3.  Aspirin 81 mg p.o. daily.  

4.  Enoxaparin 40 mg subcutaneous daily.  

5.  Magnesium citrate 300 mg p.o. daily p.r.n.  

6.  Metoprolol 50 mg p.o. daily.  

7.  Ondansetron 4 mg p.o. q.4 hours p.r.n.  

8.  Polyethylene glycol 17 g p.o. daily.  

9.  Senna 1 p.o. q.h.s.



ALLERGIES:  There is an allergy listed to penicillins.



PSYCHOSOCIAL HISTORY:  He lives alone.  He is a retired , 
and he worked in [place].  He is involved with a nonprofit organization 
called [employer] which is located in [place].  He is a nonsmoker.



FAMILY HISTORY:  Noncontributory.



REVIEW OF SYSTEMS:  He is aware of difficulty swallowing and of some left-sided 
weakness.  He denies vision changes.  He denies numbness, tingling, or loss of 
sensation.  He is not in pain.  He denies cough, dyspnea, chest pain, or 
palpitations.  He denies nausea, vomiting, constipation, or diarrhea.  He 
denies dysuria or urinary urgency.  He is sleeping well.  Otherwise, a 10-point 
review of systems is negative.



PHYSICAL EXAM:  VITALS:  Blood pressure is 136/92, heart rate 73, respiratory 
rate is 18, oxygen saturation is 93% on room air, temperature is 37.5 degrees 
centigrade.  GENERAL:  This is a well-nourished, well-developed, somewhat 
unkempt man dressed in street clothes, sitting on the bed, cooperative and in 
no acute distress.  HEENT:  Extraocular movements are intact.  Pupils are equal
, round, reactive to light.  Mucous membranes are moist.  Dentition is in good 
condition.  His tongue has a gray coating.  He has no oropharyngeal mucosal 
lesions and no posterior oropharyngeal mucus.  He has an uncrowded airway, 
Mallampati class 1.  NECK:  Supple.  HEART:  There is a regular rate and rhythm 
with no murmurs, rubs, or gallops.  LUNGS:  Clear to auscultation bilaterally.  
ABDOMEN:  Soft, nontender, nondistended with normal bowel sounds and no 
hepatosplenomegaly.  EXTREMITIES:  There is no cyanosis, clubbing, or edema.  
Radial and dorsalis pedis pulses are 2+ bilaterally.  NEUROLOGIC:  He is alert.
  Orientation was not checked.  He is dysarthric.  Cranial nerves 2 through 12 
are grossly intact, but for dysarthria.  There is no obvious cranial nerve 
lesion.  Motor strength is 5/5 overall on the left and is 4/5 at the right 
biceps, triceps, and hand .  Sensation is intact to light touch.  Deep 
tendon reflexes are 2+ bilaterally at the biceps and patellar tendons and 
absent bilaterally at the Achilles tendon.  Rapid alternating movements are 
normal.  Finger-nose testing is normal on the left and mildly slow on the 
right.  There is no tremor.  There is no rigidity.  He is able to arise from 
seated to standing at a front-wheeled walker with no assistance, though his 
balance appears to be mildly impaired.  SKIN:  There are no obvious lesions, 
and there is no skin breakdown over his buttocks, sacrum, or coccyx.



CURRENT LEVEL OF FUNCTION:  Per the preadmission screen.  He required setup for 
feeding.  He needed foam built-up utensils.  He could feed himself for several 
minutes.  He was on nectar-thick liquids and pureed diet.  Grooming required 
setup to minimal assist with voice cues.  Upper body dressing was done seated 
with setup and standby assist.  Lower body dressing required moderate assist 
and voice cues.  Toileting required moderate assist with voice cues for 
clothing management.  Bed mobility required contact guard assist.  Transfers 
required contact guard to minimal assist.  He used a front-wheeled walker.  
Balance standing required contact guard with voice cues.  Endurance was fair.  
He was able to ambulate with minimal assist and a front-wheeled walker for 150 
feet.  He was noted to have a right drop foot.  Communication was with severe 
dysarthria.  He was considered a fall risk.  



On today's exam, he seems to be requiring less assistance to arise from seated 
and likely can transfer with standby assist.  Otherwise, there are no 
significant changes to pre-admission screen.



IMPRESSION:  This is a 64-year-old man who has had a gradual onset of 
dysarthria and dysphagia and progressive weakness since November of 2018.  This 
culminated in a fall.  It took him quite a few hours to get attention from 
emergency medical services and be brought to the hospital and was outside of 
the window for thrombolysis.  Subsequent evaluation with a brain MRI ruled out 
a stroke, and further imaging and consultation with Neurosurgery ruled out 
cervical spine disease component to his symptoms.  He was considered to have 
possible myasthenia gravis versus possible bulbar neurodegenerative disease.  
He was treated with high-dose IV steroids and has had some improvement, but not 
dramatic, in his dysphagia and dysarthria.  He has deficits to mobility and 
activities of daily living.  He is appropriate for inpatient rehabilitation.  
There may be other therapeutic options available when lab results come back 
regarding the possibility of myasthenia gravis or upon followup with Neurology 
after his discharge.  



His goal is to complete a rehabilitation stay and then return home with 
supportive services.  For a safe discharge, he will need to achieve modified 
independence for eating, dressing, bed mobility, and transfers.  He will need 
to have modified independence using least restrictive device for ambulation on 
level and unlevel surfaces.  It is hoped he will progress in his diet to 
dysphagia 2 or other less restrictive diet.  He will need a good understanding 
of his impairments and the use of compensatory strategies.  



He will have therapy with Physical Therapy, Occupational Therapy, and Speech 
and Language Pathology for 60 minutes per day for each discipline on 5 to 7 
days of the week.  His expected duration of stay is 7 to 10 days.  



It is anticipated that, upon discharge, he will continue to benefit from home 
nursing, speech and language pathology, a nurse's aide, occupational therapy, 
and physical therapy.  He may also benefit from a support group.



PLAN:  

1.  Dysphagia and dysarthria, progressive over approximately 5 months, with an 
unknown etiology at present.  He has had some improvements during his 
hospitalization.  Assessment and treatment per Speech and Language Pathology.  
Initially, he will be continued on nectar-thick liquids and a dysphagia 1 diet.
  I have also ordered mouth care after each meal.  

2.  Debility with right-sided weakness in a formerly right-handed man.  
Evaluation and treatment per Physical Therapy and Occupational Therapy.  

3.  Risk for dehydration.  He was clearly dehydrated on labs when he was 
admitted, and he had thick secretions covering his tongue.  I will obtain a 
basic metabolic profile and, given that he is on nectar-thick liquids, we will 
consider the need for IV hydration.  

4.  Hypertension.  He was on metoprolol alone prior to admission and now has 
the addition of amlodipine.  I will order orthostatic blood pressure and pulse 
to rule out any autonomic dysfunction.  

5.  Urinary incontinence.  Per report from [facility], he was continent 
of urine, but he had an incontinent episode when he arrived on the inpatient 
rehabilitation unit.  I have ordered a bladder scan to rule out urinary 
retention and overflow incontinence.  

6.  Unclear indication for aspirin.  We will discuss further and level of 
threshold to discontinue, especially as he is currently on enoxaparin.  

7.  Prophylaxis.  He has had limited mobility which, combined with his age, 
increases his risk for deep venous thrombosis.  Continue enoxaparin until 
mobility considerably improves.  

8.  Followup.  He will see neurology, Dr. Malave, after his discharge.  He will 
also need to establish with a primary care provider.





Job #:  [id number]

MTDD

## 2019-03-26 LAB — PLATELET # BLD: 203 10^3/UL (ref 150–400)

## 2019-03-26 RX ADMIN — ASPIRIN SCH MG: 81 TABLET, DELAYED RELEASE ORAL at 08:54

## 2019-03-26 RX ADMIN — STANDARDIZED SENNA CONCENTRATE SCH: 8.6 TABLET ORAL at 22:13

## 2019-03-26 RX ADMIN — POLYETHYLENE GLYCOL 3350 SCH GM: 17 POWDER, FOR SOLUTION ORAL at 08:56

## 2019-03-26 NOTE — SOAPPROG
SOAP Progress Note


Assessment/Plan: 


Assessment:





Dysphagia and dysarthria, progressive over approximately 5 months, with an 

unknown etiology at present.  He has had some improvements during his 

hospitalization.  Assessment and treatment per Speech and Language Pathology.  

Initially, he will be continued on nectar-thick liquids and a dysphagia 1 diet.

  I have also ordered mouth care after each meal.  





Debility with right-sided weakness in a formerly right-handed man.  Evaluation 

and treatment per Physical Therapy and Occupational Therapy.  





Risk for dehydration.  He was clearly dehydrated on labs when he was admitted, 

and he had thick secretions covering his tongue.  I will obtain a basic 

metabolic profile and, given that he is on nectar-thick liquids, we will 

consider the need for IV hydration.  





Cognitive impairment.  Scored 19/30 on the [place] cognitive assessment.  

Prominent memory loss; also deficit to executive function.  


* Continue treatment per speech and language pathology.





Hypertension.  He was on metoprolol alone prior to admission and now has the 

addition of amlodipine. 


* Also with orthostasis.  He denies lightheadedness.


* Diastolic dysfunction and mild LVH on echo in the hospital.


* Will increase amlodipine form 2.5 mg to 5 mg QD starting 3/27/2019.  Will 

decrease metoprolol ER from 50 mg QD to 25 mg QD to allow more heart rate 

reactivity to orthostasis.





Urinary incontinence.  Per report from [facility], he was continent of 

urine, but he had an incontinent episode when he arrived on the inpatient 

rehabilitation unit. 


* Negligible PVR.





Unclear indication for aspirin.  Discontinue starting 3/27/2019.





Prophylaxis.  He has had limited mobility which, combined with his age, 

increases his risk for deep venous thrombosis.  


* Therapies report much improved mobility.  Discontinue enoxaparin starting 3/27

/2019.





Followup.  He will see neurology, Dr. Malave, after his discharge.  He will also 

need to establish with a primary care provider.








03/26/19 14:32





Subjective: 





No complaints.  Slept well.  Not in pain.  No fevers or chills, no cough or 

dyspnea.


Objective: 





 Vital Signs











Temp Pulse Resp BP Pulse Ox


 


 36.9 C   68   16   140/94 H  92 


 


 03/26/19 08:00  03/26/19 08:54  03/26/19 08:00  03/26/19 08:55  03/26/19 08:00








 











 03/25/19 03/26/19 03/27/19





 05:59 05:59 05:59


 


Intake Total  580 


 


Output Total  750 


 


Balance  -170 














Physical Exam





- Physical Exam


General Appearance: WD/WN, alert, no apparent distress


Respiratory: normal breath sounds, No crackles, No rhonchi, No wheezing


Cardiac/Chest: regular rate, rhythm, No edema, No diastolic murmur, No systolic 

murmur


Skin: normal color, warm/dry


Neuro/Psych: alert, normal mood/affect, oriented x 3, speech abnormalities (

Dysarthria)





ICD10 Worksheet


Patient Problems: 


 Problems











Problem Status Onset


 


Aphasic disturbance Acute  


 


Weakness Acute

## 2019-03-27 RX ADMIN — METOPROLOL SUCCINATE SCH MG: 25 TABLET, EXTENDED RELEASE ORAL at 09:10

## 2019-03-27 RX ADMIN — ASPIRIN SCH MG: 81 TABLET, DELAYED RELEASE ORAL at 09:10

## 2019-03-27 RX ADMIN — STANDARDIZED SENNA CONCENTRATE SCH: 8.6 TABLET ORAL at 20:28

## 2019-03-27 RX ADMIN — POLYETHYLENE GLYCOL 3350 SCH: 17 POWDER, FOR SOLUTION ORAL at 09:11

## 2019-03-27 NOTE — SOAPPROG
SOAP Progress Note


Assessment/Plan: 


Assessment:





Dysphagia and dysarthria, progressive over approximately 5 months, with an 

unknown etiology at present.  He has had some improvements during his 

hospitalization.  


* Disorganized oral phase of swallowing with pharyngeal pooling.  Lingual 

function with weakness and decreased coordination impacts articulation of 

speech and swallowing.


* Continued on nectar-thick liquids and a dysphagia 1 diet. 


* Continue SLP





Debility with right-sided weakness in a formerly right-handed man.  


* Initial functional independence measure is 68 on 3/27/2019.  Independent with 

bed mobility.  Contact guard assist for transfers.  Ambulated 150 ft with 

contact guard assist using a front wheeled walker.  Benefitting from an AFO for 

right foot drop, which has been a chronic problem.  Climbed and descended 3 

stairs with bilateral rails.  Grooming and hygiene are done standing with 

contact guard assist.  Upper body requires setup and standby assist.  Lower 

body dressing with minimal assist for the AFO and shoes.  Bath transfers done 

with supervision and bathing with supervision.  Toileting requires contact 

guard assist.  Decreased coordination more so on the right lower extremity than 

the left.


* Continue treatment per Physical Therapy and Occupational Therapy.  





Risk for dehydration with need for thickened liquids.  


*  with BUN over creatinine greater than 20-1.  Orthostatic by pulse on 3

/27/2019.


* Continue efforts at hydration.





Cognitive impairment.  Scored 19/30 on the [place] cognitive assessment.  

Prominent memory loss; also deficit to executive function, reasoning, lexical 

fluency..  


* Continue treatment per speech and language pathology.





Hypertension.  He was on metoprolol alone prior to admission and now has the 

addition of amlodipine. 


* Also with orthostasis.  He denies lightheadedness.


* Diastolic dysfunction and mild LVH on echo in the hospital.


* Increased amlodipine form 2.5 mg to 5 mg QD starting 3/27/2019, and decreased 

metoprolol ER from 50 mg QD to 25 mg QD to allow more heart rate reactivity to 

orthostasis.





Urinary incontinence.  Per report from [facility] ([place], [place]), he was continent of 

urine, but he had an incontinent episode when he arrived on the inpatient 

rehabilitation unit. 


* Negligible PVR.





Unclear indication for aspirin.  Discontinue starting 3/27/2019.





Prophylaxis.  He has had limited mobility which, combined with his age, 

increases his risk for deep venous thrombosis.  


* Therapies report much improved mobility.  Discontinue enoxaparin starting 3/27

/2019.





DISPOSITION:  Attended staffing, 3/27/2019, 15 min.  Discussed with case 

management, nursing, dietitian, PT, OT, SLP.  Lives alone, neighbors are helpful

, but he may need a conservator.  Functional goal is independence or modified 

independence, and least restrictive diet.  Discharge date set for 4/9/2019.





Followup.  He will see Neurology, Dr. Malave, after his discharge.  He will also 

need to establish with a primary care provider.





03/27/19 11:32





Subjective: 





No complaints.  Denies mouth pain or pain with swallowing.  No fevers or chills

, no cough or dyspnea.


Objective: 





 Vital Signs











Temp Pulse Resp BP Pulse Ox


 


 36.4 C   68   16   128/75 H  96 


 


 03/27/19 08:00  03/27/19 08:00  03/27/19 08:00  03/27/19 08:00  03/27/19 08:00








 Laboratory Results





 03/26/19 15:50 





 03/26/19 15:50 





 











 03/26/19 03/27/19 03/28/19





 05:59 05:59 05:59


 


Intake Total 580 2840 580


 


Output Total 750 2000 675


 


Balance -170 840 -95














- Time Spent With Patient


Time Spent With Patient: 





Greater than 35 min floor time today, including more than 50% of time in 

coordination of care during staffing meeting, and counseling patient.





Physical Exam





- Physical Exam


General Appearance: WD/WN, alert, no apparent distress


EENT: pharynx normal (Brownish coating on tongue)


Respiratory: No respiratory distress, No accessory muscle use


Cardiac/Chest: No edema


Skin: normal color, warm/dry


Neuro/Psych: alert, normal mood/affect, oriented x 3





ICD10 Worksheet


Patient Problems: 


 Problems











Problem Status Onset


 


Aphasic disturbance Acute  


 


Weakness Acute

## 2019-03-28 RX ADMIN — POLYETHYLENE GLYCOL 3350 SCH GM: 17 POWDER, FOR SOLUTION ORAL at 10:05

## 2019-03-28 RX ADMIN — STANDARDIZED SENNA CONCENTRATE SCH TAB: 8.6 TABLET ORAL at 21:07

## 2019-03-28 RX ADMIN — METOPROLOL SUCCINATE SCH MG: 25 TABLET, EXTENDED RELEASE ORAL at 10:04

## 2019-03-28 RX ADMIN — ASPIRIN SCH MG: 81 TABLET, DELAYED RELEASE ORAL at 10:04

## 2019-03-28 NOTE — SOAPPROG
SOAP Progress Note


Assessment/Plan: 


Assessment:





Dysphagia and dysarthria, progressive over approximately 5 months, with an 

unknown etiology at present.  He has had some improvements during his 

hospitalization.  


* Disorganized oral phase of swallowing with pharyngeal pooling.  Lingual 

function with weakness and decreased coordination impacts articulation of 

speech and swallowing.


* Continued on nectar-thick liquids and a dysphagia 1 diet. 


* Continue SLP





Debility with right-sided weakness in a formerly right-handed man.  


* Initial functional independence measure is 68 on 3/27/2019.  Independent with 

bed mobility.  Contact guard assist for transfers.  Ambulated 150 ft with 

contact guard assist using a front wheeled walker.  Benefitting from an AFO for 

right foot drop, which has been a chronic problem.  Climbed and descended 3 

stairs with bilateral rails.  Grooming and hygiene are done standing with 

contact guard assist.  Upper body requires setup and standby assist.  Lower 

body dressing with minimal assist for the AFO and shoes.  Bath transfers done 

with supervision and bathing with supervision.  Toileting requires contact 

guard assist.  Decreased coordination more so on the right lower extremity than 

the left.


* Continue treatment per Physical Therapy and Occupational Therapy.  





Risk for dehydration with need for thickened liquids.  


*  with BUN over creatinine greater than 20-1.  Orthostatic by pulse on 3

/27/2019.


* Continue efforts at hydration.





Cognitive impairment.  Scored 19/30 on the [place] cognitive assessment.  

Prominent memory loss; also deficit to executive function, reasoning, lexical 

fluency..  


* Continue treatment per speech and language pathology.





Hypertension.  He was on metoprolol alone prior to admission and now has the 

addition of amlodipine. 


* Also with orthostasis.  He denies lightheadedness.


* Diastolic dysfunction and mild LVH on echo in the hospital.


* Increased amlodipine form 2.5 mg to 5 mg QD starting 3/27/2019, and decreased 

metoprolol ER from 50 mg QD to 25 mg QD to allow more heart rate reactivity to 

orthostasis.





Urinary incontinence.  Per report from [facility], he was continent of 

urine, but he had an incontinent episode when he arrived on the inpatient 

rehabilitation unit. 


* Negligible PVR.





Unclear indication for aspirin.  Discontinue starting 3/28/2019.





Prophylaxis.  He has had limited mobility which, combined with his age, 

increases his risk for deep venous thrombosis.  


* Therapies report much improved mobility.  Discontinue enoxaparin starting 3/27

/2019.





DISPOSITION:  Attended staffing, 3/27/2019, 15 min.  Discussed with case 

management, nursing, dietitian, PT, OT, SLP.  Lives alone, neighbors are helpful

, but he may need a conservator.  Functional goal is independence or modified 

independence, and least restrictive diet.  Discharge date set for 4/9/2019.





Followup.  He will see Neurology, Dr. Malave, after his discharge.  He will also 

need to establish with a primary care provider.








03/28/19 11:41





Subjective: 





Constipation for several days.  Otherwise without complaints.  Not in pain, no 

nausea or vomiting, no fevers or chills, no cough or dyspnea.


Objective: 





 Vital Signs











Temp Pulse Resp BP Pulse Ox


 


 37.0 C   63   16   136/75 H  94 


 


 03/28/19 07:15  03/28/19 10:04  03/28/19 07:15  03/28/19 10:04  03/28/19 07:15








 Laboratory Results





 03/26/19 15:50 





 03/26/19 15:50 





 











 03/27/19 03/28/19 03/29/19





 05:59 05:59 05:59


 


Intake Total 2840 820 


 


Output Total 2000 1975 450


 


Balance 840 -1155 -450














Physical Exam





- Physical Exam


General Appearance: WD/WN, alert, no apparent distress


Respiratory: No respiratory distress, No accessory muscle use


Skin: normal color, warm/dry


Neuro/Psych: abnormal gait (Steppage, with FWW), speech abnormalities (

Dysarthric)





ICD10 Worksheet


Patient Problems: 


 Problems











Problem Status Onset


 


Aphasic disturbance Acute  


 


Weakness Acute

## 2019-03-29 RX ADMIN — STANDARDIZED SENNA CONCENTRATE SCH TAB: 8.6 TABLET ORAL at 20:07

## 2019-03-29 RX ADMIN — POLYETHYLENE GLYCOL 3350 SCH GM: 17 POWDER, FOR SOLUTION ORAL at 08:28

## 2019-03-29 RX ADMIN — METOPROLOL SUCCINATE SCH MG: 25 TABLET, EXTENDED RELEASE ORAL at 08:28

## 2019-03-29 NOTE — SOAPPROG
SOAP Progress Note


Assessment/Plan: 


Assessment:





Dysphagia and dysarthria, progressive over approximately 5 months, with an 

unknown etiology at present.  He has had some improvements during his 

hospitalization.  


* Disorganized oral phase of swallowing with pharyngeal pooling.  Lingual 

function with weakness and decreased coordination impacts articulation of 

speech and swallowing.


* Continued on nectar-thick liquids and a dysphagia 1 diet. 


* Trial of dysphagia 2 diet and thin liquids, 3/29/2019.


* Continue SLP





Debility with right-sided weakness in a formerly right-handed man.  


* Initial functional independence measure is 68 on 3/27/2019.  Independent with 

bed mobility.  Contact guard assist for transfers.  Ambulated 150 ft with 

contact guard assist using a front wheeled walker.  Benefitting from an AFO for 

right foot drop, which has been a chronic problem.  Climbed and descended 3 

stairs with bilateral rails.  Grooming and hygiene are done standing with 

contact guard assist.  Upper body requires setup and standby assist.  Lower 

body dressing with minimal assist for the AFO and shoes.  Bath transfers done 

with supervision and bathing with supervision.  Toileting requires contact 

guard assist.  Decreased coordination more so on the right lower extremity than 

the left.


* Has since ambulated 500 ft outside with front wheeled walker on uneven 

surfaces, contact guard assist.


* Continue treatment per Physical Therapy and Occupational Therapy.  





Risk for dehydration with need for thickened liquids.  


*  with BUN over creatinine greater than 20-1.  Orthostatic by pulse on 3

/27/2019.


* Continue efforts at hydration.





Cognitive impairment.  Scored 19/30 on the [place] cognitive assessment.  

Prominent memory loss; also deficit to executive function, reasoning, lexical 

fluency..  


* Continue treatment per speech and language pathology.





Constipation.  Discussed with patient 3/29/2019.  He thinks he may have a bowel 

movement today.  If not, will increase laxatives.





Hypertension.  He was on metoprolol alone prior to admission and now has the 

addition of amlodipine. 


* Also with orthostasis.  He denies lightheadedness.


* Diastolic dysfunction and mild LVH on echo in the hospital.


* Increased amlodipine form 2.5 mg to 5 mg QD starting 3/27/2019, and decreased 

metoprolol ER from 50 mg QD to 25 mg QD to allow more heart rate reactivity to 

orthostasis.





Urinary incontinence.  Per report from [facility], he was continent of 

urine, but he had an incontinent episode when he arrived on the inpatient 

rehabilitation unit. 


* Negligible PVR.





Unclear indication for aspirin.  Discontinue starting 3/28/2019.





Prophylaxis.  He has had limited mobility which, combined with his age, 

increases his risk for deep venous thrombosis.  


* Therapies report much improved mobility.  Discontinue enoxaparin starting 3/27

/2019.





DISPOSITION:  Attended staffing, 3/27/2019, 15 min.  Discussed with case 

management, nursing, dietitian, PT, OT, SLP.  Lives alone, neighbors are helpful

, but he may need a conservator.  Functional goal is independence or modified 

independence, and least restrictive diet.  Discharge date set for 4/9/2019.





Followup.  He will see Neurology, Dr. Malave, after his discharge.  He will also 

need to establish with a primary care provider.








03/29/19 09:31





03/29/19 09:34





Subjective: 





No complaints.  Has constipation times several days.  Reports his usual habit 

is about every other day.  Denies abdominal pain.  No nausea or vomiting.  Good 

appetite.


Objective: 





 Vital Signs











Temp Pulse Resp BP Pulse Ox


 


 36.9 C   61   16   107/66   94 


 


 03/29/19 06:17  03/29/19 06:17  03/29/19 06:17  03/29/19 06:17  03/29/19 06:17








 Laboratory Results





 03/26/19 15:50 





 03/26/19 15:50 





 











 03/28/19 03/29/19 03/30/19





 05:59 05:59 05:59


 


Intake Total 820 1650 


 


Output Total 1975 1100 


 


Balance -1155 550 














Physical Exam





- Physical Exam


General Appearance: WD/WN, alert, no apparent distress


Respiratory: No respiratory distress, No accessory muscle use


Abdomen: non-tender, soft, No distended


Skin: normal color, warm/dry


Neuro/Psych: alert, normal mood/affect, oriented x 3, abnormal gait, speech 

abnormalities (Dysarthria)





ICD10 Worksheet


Patient Problems: 


 Problems











Problem Status Onset


 


Aphasic disturbance Acute  


 


Weakness Acute

## 2019-03-30 RX ADMIN — METOPROLOL SUCCINATE SCH MG: 25 TABLET, EXTENDED RELEASE ORAL at 07:58

## 2019-03-30 RX ADMIN — POLYETHYLENE GLYCOL 3350 SCH GM: 17 POWDER, FOR SOLUTION ORAL at 07:58

## 2019-03-30 RX ADMIN — STANDARDIZED SENNA CONCENTRATE SCH TAB: 8.6 TABLET ORAL at 20:03

## 2019-03-30 NOTE — SOAPPROG
SOAP Progress Note


Assessment/Plan: 


Assessment:





Dysphagia and dysarthria, progressive over approximately 5 months, with an 

unknown etiology at present.  He has had some improvements during his 

hospitalization.  


* Disorganized oral phase of swallowing with pharyngeal pooling.  Lingual 

function with weakness and decreased coordination impacts articulation of 

speech and swallowing.


* Continued on nectar-thick liquids and a dysphagia 1 diet. 


* Trial of dysphagia 2 diet and thin liquids, 3/29/2019.


* Continue SLP





Debility with right-sided weakness in a formerly right-handed man.  


* Initial functional independence measure is 68 on 3/27/2019.  Independent with 

bed mobility.  Contact guard assist for transfers.  Ambulated 150 ft with 

contact guard assist using a front wheeled walker.  Benefitting from an 

bilateral AFOs for foot drop, which has been a chronic problem, likely due to 

spinal issues with history of lumbar surgery.  Climbed and descended 3 stairs 

with bilateral rails.  Grooming and hygiene are done standing with contact 

guard assist.  Upper body requires setup and standby assist.  Lower body 

dressing with minimal assist for the AFOs and shoes.  Bath transfers done with 

supervision and bathing with supervision.  Toileting requires contact guard 

assist.  Decreased coordination more so on the right lower extremity than the 

left.


* Has since ambulated 500 ft outside with front wheeled walker on uneven 

surfaces, contact guard assist.


* Continue treatment per Physical Therapy and Occupational Therapy.  





Risk for dehydration with need for thickened liquids.  


* BMP 3/26 with BUN over creatinine greater than 20-1.  Orthostatic by pulse on 

3/27/2019.


* Continue efforts at hydration.





Cognitive impairment.  Scored 19/30 on the [place] cognitive assessment.  

Prominent memory loss; also deficit to executive function, reasoning, lexical 

fluency..  


* Continue treatment per speech and language pathology.





Constipation.  Had bowel movement 3/29/2019.  Continue laxatives.





Hypertension.  He was on metoprolol alone prior to admission and now has the 

addition of amlodipine. 


* Also with orthostasis.  He denies lightheadedness.


* Diastolic dysfunction and mild LVH on echo in the hospital.


* Increased amlodipine form 2.5 mg to 5 mg QD starting 3/27/2019, and decreased 

metoprolol ER from 50 mg QD to 25 mg QD to allow more heart rate reactivity to 

orthostasis.





Urinary incontinence.  Per report from [facility], he was continent of 

urine, but he had an incontinent episode when he arrived on the inpatient 

rehabilitation unit. 


* Negligible PVR.





Unclear indication for aspirin.  Discontinue starting 3/28/2019.





Prophylaxis.  He has had limited mobility which, combined with his age, 

increases his risk for deep venous thrombosis.  


* Therapies report much improved mobility.  Discontinue enoxaparin starting 3/27

/2019.





DISPOSITION:  Attended staffing, 3/27/2019, 15 min.  Discussed with case 

management, nursing, dietitian, PT, OT, SLP.  Lives alone, neighbors are helpful

, but he may need a conservator.  Functional goal is independence or modified 

independence, and least restrictive diet.  Discharge date set for 4/9/2019.





Followup.  He will see Neurology, Dr. Malave, after his discharge.  He will also 

need to establish with a primary care provider.





03/30/19 10:06





Subjective: 





No complaints.  Not in pain.  Sleeping well.  Bowels moved yesterday.  He feels 

he is walking better with bilateral AFOs.


Objective: 





 Vital Signs











Temp Pulse Resp BP Pulse Ox


 


 36.9 C   73   16   129/91 H  95 


 


 03/30/19 06:00  03/30/19 06:00  03/30/19 06:00  03/30/19 06:00  03/30/19 06:00








 Laboratory Results





 03/26/19 15:50 





 03/26/19 15:50 





 











 03/29/19 03/30/19 03/31/19





 05:59 05:59 05:59


 


Intake Total 1650 2000 720


 


Output Total 1100 1400 


 


Balance 550 600 720














Physical Exam





- Physical Exam


General Appearance: WD/WN, alert, no apparent distress


Respiratory: normal breath sounds, No crackles, No rhonchi, No wheezing


Cardiac/Chest: regular rate, rhythm, No diastolic murmur, No systolic murmur


Skin: normal color, warm/dry


Neuro/Psych: alert, normal mood/affect, oriented x 3, speech abnormalities (

Dysarthria)





ICD10 Worksheet


Patient Problems: 


 Problems











Problem Status Onset


 


Aphasic disturbance Acute  


 


Weakness Acute

## 2019-03-31 RX ADMIN — STANDARDIZED SENNA CONCENTRATE SCH TAB: 8.6 TABLET ORAL at 21:18

## 2019-03-31 RX ADMIN — POLYETHYLENE GLYCOL 3350 SCH GM: 17 POWDER, FOR SOLUTION ORAL at 07:52

## 2019-03-31 RX ADMIN — METOPROLOL SUCCINATE SCH MG: 25 TABLET, EXTENDED RELEASE ORAL at 07:53

## 2019-03-31 NOTE — SOAPPROG
SOAP Progress Note


Assessment/Plan: 


Assessment:





Dysphagia and dysarthria, progressive over approximately 5 months, with an 

unknown etiology at present.  He has had some improvements during his 

hospitalization.  


* Disorganized oral phase of swallowing with pharyngeal pooling.  Lingual 

function with weakness and decreased coordination impacts articulation of 

speech and swallowing.


* Continued on nectar-thick liquids and a dysphagia 1 diet. 


* Advanced from dysphagia 1 diet and nectar thick liquids to dysphagia 2 diet 

and thin liquids, 3/29/2019.


* Continue SLP





Debility with right-sided weakness in a formerly right-handed man.  


* Initial functional independence measure is 68 on 3/27/2019.  Independent with 

bed mobility.  Contact guard assist for transfers.  Ambulated 150 ft with 

contact guard assist using a front wheeled walker.  Benefitting from an 

bilateral AFOs for foot drop, which has been a chronic problem, likely due to 

spinal issues with history of lumbar surgery.  Climbed and descended 3 stairs 

with bilateral rails.  Grooming and hygiene are done standing with contact 

guard assist.  Upper body requires setup and standby assist.  Lower body 

dressing with minimal assist for the AFOs and shoes.  Bath transfers done with 

supervision and bathing with supervision.  Toileting requires contact guard 

assist.  Decreased coordination more so on the right lower extremity than the 

left.


* Has since ambulated 500 ft outside with front wheeled walker on uneven 

surfaces, contact guard assist.  Advanced to independent in his room.  Reports 

that he is able to Don and doff AFOs independently.


* Continue treatment per Physical Therapy and Occupational Therapy.  





Risk for dehydration with need for thickened liquids.  


* BMP 3/26 with BUN over creatinine greater than 20-1.  Orthostatic by pulse on 

3/27/2019.


* Continue efforts at hydration.





Cognitive impairment.  Scored 19/30 on the [place] cognitive assessment.  

Prominent memory loss; also deficit to executive function, reasoning, lexical 

fluency..  


* Continue treatment per speech and language pathology.





Constipation.  Had bowel movement 3/29/2019.  Continue laxatives.





Hypertension.  He was on metoprolol alone prior to admission and now has the 

addition of amlodipine. 


* Also with orthostasis.  He denies lightheadedness.


* Diastolic dysfunction and mild LVH on echo in the hospital.


* Increased amlodipine form 2.5 mg to 5 mg QD starting 3/27/2019, and decreased 

metoprolol ER from 50 mg QD to 25 mg QD to allow more heart rate reactivity to 

orthostasis.





Urinary incontinence.  Per report from [facility], he was continent of 

urine, but he had an incontinent episode when he arrived on the inpatient 

rehabilitation unit. 


* Negligible PVR.





Unclear indication for aspirin.  Discontinue starting 3/28/2019.





Prophylaxis.  He has had limited mobility which, combined with his age, 

increases his risk for deep venous thrombosis.  


* Therapies report much improved mobility.  Discontinue enoxaparin starting 3/27

/2019.





DISPOSITION:  Attended staffing, 3/27/2019, 15 min.  Discussed with case 

management, nursing, dietitian, PT, OT, SLP.  Lives alone, neighbors are helpful

, but he may need a conservator.  Functional goal is independence or modified 

independence, and least restrictive diet.  Discharge date set for 4/9/2019.





Followup.  He will see Neurology, Dr. Malave, after his discharge.  He will also 

need to establish with a primary care provider.








03/31/19 11:11





Subjective: 





No complaints.  Slept well.  Not in pain.  No cough or dyspnea, no fever 

chills.  Has advanced to dysphagia 2 diet with thin liquids and to independent 

in his room.  Ambulating better with AFOs.


Objective: 





 Vital Signs











Temp Pulse Resp BP Pulse Ox


 


 36.8 C   63   15   109/69   95 


 


 03/31/19 05:32  03/31/19 05:32  03/31/19 05:32  03/31/19 05:32  03/31/19 05:32








 Laboratory Results





 03/26/19 15:50 





 03/26/19 15:50 





 











 03/30/19 03/31/19 04/01/19





 05:59 05:59 05:59


 


Intake Total 2000 1110 340


 


Output Total 1400 1750 


 


Balance 600 -640 340














Physical Exam





- Physical Exam


General Appearance: WD/WN, alert, no apparent distress


Respiratory: No respiratory distress, No accessory muscle use


Skin: normal color, warm/dry


Neuro/Psych: alert, normal mood/affect, oriented x 3, abnormal gait (Normal pace

, normal step length.  Step oz and foot drag have been corrected with bilateral 

AFOs.  Using front wheeled walker.), speech abnormalities (Dysarthria)





ICD10 Worksheet


Patient Problems: 


 Problems











Problem Status Onset


 


Aphasic disturbance Acute  


 


Weakness Acute

## 2019-04-01 RX ADMIN — POLYETHYLENE GLYCOL 3350 SCH GM: 17 POWDER, FOR SOLUTION ORAL at 09:18

## 2019-04-01 RX ADMIN — STANDARDIZED SENNA CONCENTRATE SCH TAB: 8.6 TABLET ORAL at 19:52

## 2019-04-01 RX ADMIN — METOPROLOL SUCCINATE SCH MG: 25 TABLET, EXTENDED RELEASE ORAL at 09:17

## 2019-04-01 NOTE — SOAPPROG
SOAP Progress Note


Assessment/Plan: 


Assessment:





Dysphagia and dysarthria, progressive over approximately 5 months, with an 

unknown etiology at present.  He has had some improvements during his 

hospitalization.  


* Disorganized oral phase of swallowing with pharyngeal pooling.  Lingual 

function with weakness and decreased coordination impacts articulation of 

speech and swallowing.


* Continued on nectar-thick liquids and a dysphagia 1 diet. 


* Advanced from dysphagia 1 diet and nectar thick liquids to dysphagia 2 diet 

and thin liquids, 3/29/2019.


* Continue SLP





Debility with right-sided weakness in a formerly right-handed man.  


* Initial functional independence measure is 68 on 3/27/2019.  Independent with 

bed mobility.  Contact guard assist for transfers.  Ambulated 150 ft with 

contact guard assist using a front wheeled walker.  Benefitting from an 

bilateral AFOs for foot drop, which has been a chronic problem, likely due to 

spinal issues with history of lumbar surgery.  Climbed and descended 3 stairs 

with bilateral rails.  Grooming and hygiene are done standing with contact 

guard assist.  Upper body requires setup and standby assist.  Lower body 

dressing with minimal assist for the AFOs and shoes.  Bath transfers done with 

supervision and bathing with supervision.  Toileting requires contact guard 

assist.  Decreased coordination more so on the right lower extremity than the 

left.


* Has since ambulated 500 ft outside with front wheeled walker on uneven 

surfaces, contact guard assist.  Advanced to independent in his room.  Reports 

that he is able to Don and doff AFOs independently.


* Continue treatment per Physical Therapy and Occupational Therapy.  





Risk for dehydration with need for thickened liquids.  


* BMP 3/26 with BUN over creatinine greater than 20-1.  Orthostatic by pulse on 

3/27/2019.


* Continue efforts at hydration.





Cognitive impairment.  Scored 19/30 on the [place] cognitive assessment.  

Prominent memory loss; also deficit to executive function, reasoning, lexical 

fluency..  


* Continue treatment per speech and language pathology.





Constipation.  Had bowel movement 3/29/2019.  Continue laxatives.





Hypertension.  He was on metoprolol alone prior to admission and now has the 

addition of amlodipine. 


* Also with orthostasis.  He denies lightheadedness.  Orthostatic blood 

pressure much improved, 3/31/2019.


* Diastolic dysfunction and mild LVH on echo in the hospital.


* Increased amlodipine form 2.5 mg to 5 mg QD starting 3/27/2019, and decreased 

metoprolol ER from 50 mg QD to 25 mg QD to allow more heart rate reactivity to 

orthostasis.





Urinary incontinence.  Per report from [facility], he was continent of 

urine, but he had an incontinent episode when he arrived on the inpatient 

rehabilitation unit. 


* Negligible PVR.





Unclear indication for aspirin.  Discontinue starting 3/28/2019.





Prophylaxis.  He has had limited mobility which, combined with his age, 

increases his risk for deep venous thrombosis.  


* Therapies report much improved mobility.  Discontinue enoxaparin starting 3/27

/2019.





DISPOSITION:  Attended staffing, 3/27/2019, 15 min.  Discussed with case 

management, nursing, dietitian, PT, OT, SLP.  Lives alone, neighbors are helpful

, but he may need a conservator.  Functional goal is independence or modified 

independence, and least restrictive diet.  Discharge date set for 4/9/2019.





Followup.  He will see Neurology, Dr. Malave, after his discharge.  He will also 

need to establish with a primary care provider.








04/01/19 09:47





Subjective: 





No complaints.  Slept well.  Not in pain.  Thinks his speech is improving.


Objective: 





 Vital Signs











Temp Pulse Resp BP Pulse Ox


 


 37.1 C   82   16   121/77 H  99 


 


 04/01/19 06:19  04/01/19 09:17  04/01/19 06:19  04/01/19 09:17  04/01/19 06:19








 Laboratory Results





 03/26/19 15:50 





 03/26/19 15:50 





 











 03/31/19 04/01/19 04/02/19





 05:59 05:59 05:59


 


Intake Total 1110 1460 


 


Output Total 1750 1000 


 


Balance -640 460 














Physical Exam





- Physical Exam


General Appearance: WD/WN, alert, no apparent distress


Respiratory: No respiratory distress, No accessory muscle use


Skin: normal color, warm/dry


Neuro/Psych: alert, normal mood/affect, oriented x 3, speech abnormalities (

Dysarthria)





ICD10 Worksheet


Patient Problems: 


 Problems











Problem Status Onset


 


Aphasic disturbance Acute  


 


Weakness Acute

## 2019-04-02 RX ADMIN — STANDARDIZED SENNA CONCENTRATE SCH TAB: 8.6 TABLET ORAL at 20:15

## 2019-04-02 RX ADMIN — POLYETHYLENE GLYCOL 3350 SCH GM: 17 POWDER, FOR SOLUTION ORAL at 08:11

## 2019-04-02 RX ADMIN — METOPROLOL SUCCINATE SCH MG: 25 TABLET, EXTENDED RELEASE ORAL at 08:10

## 2019-04-02 NOTE — SOAPPROG
SOAP Progress Note


Assessment/Plan: 


Assessment:





Dysphagia and dysarthria, progressive over approximately 5 months, with an 

unknown etiology at present.  He has had some improvements during his 

hospitalization.  


* Disorganized oral phase of swallowing with pharyngeal pooling.  Lingual 

function with weakness and decreased coordination impacts articulation of 

speech and swallowing.


* Initially on nectar-thick liquids and a dysphagia 1 diet.  Advanced to 

dysphagia 2 diet and thin liquids, 3/29/2019.


* Continue SLP





Debility with right-sided weakness in a formerly right-handed man.  


* Initial functional independence measure is 68 on 3/27/2019.  Independent with 

bed mobility.  Contact guard assist for transfers.  Ambulated 150 ft with 

contact guard assist using a front wheeled walker.  Benefitting from an 

bilateral AFOs for foot drop, which has been a chronic problem, likely due to 

spinal issues with history of lumbar surgery.  Climbed and descended 3 stairs 

with bilateral rails.  Grooming and hygiene are done standing with contact 

guard assist.  Upper body requires setup and standby assist.  Lower body 

dressing with minimal assist for the AFOs and shoes.  Bath transfers done with 

supervision and bathing with supervision.  Toileting requires contact guard 

assist.  Decreased coordination more so on the right lower extremity than the 

left.


* Has since ambulated 500 ft outside with front wheeled walker on uneven 

surfaces, contact guard assist.  Advanced to independent in his room.  Reports 

that he is able to Don and doff AFOs independently.


* Continue treatment per Physical Therapy and Occupational Therapy.  





Risk for dehydration with need for thickened liquids.  


* BMP 3/26 with BUN over creatinine greater than 20-1.  Orthostatic by pulse on 

3/27/2019.


* Continue efforts at hydration.





Cognitive impairment.  Scored 19/30 on the [place] cognitive assessment.  

Prominent memory loss; also deficit to executive function, reasoning, lexical 

fluency..  


* MoCA score increased to 24/30 on 4/2/2019.  


* Continue treatment per speech and language pathology.





Constipation.  Had bowel movement 3/31/2019.  Continue laxatives.





Hypertension.  He was on metoprolol alone prior to admission and now has the 

addition of amlodipine. 


* Also with orthostasis.  He denies lightheadedness.  Orthostatic blood 

pressure much improved, 3/31/2019.


* Diastolic dysfunction and mild LVH on echo in the hospital.


* Increased amlodipine form 2.5 mg to 5 mg QD starting 3/27/2019, and decreased 

metoprolol ER from 50 mg QD to 25 mg QD to allow more heart rate reactivity to 

orthostasis.





Urinary incontinence.  Per report from [facility], he was continent of 

urine, but he had an incontinent episode when he arrived on the inpatient 

rehabilitation unit. 


* Negligible PVR.





Unclear indication for aspirin.  Discontinue starting 3/28/2019.





Prophylaxis.  He has had limited mobility which, combined with his age, 

increases his risk for deep venous thrombosis.  


* Therapies report much improved mobility.  Discontinue enoxaparin starting 3/27

/2019.





DISPOSITION:  Attended staffing, 3/27/2019, 15 min.  Discussed with case 

management, nursing, dietitian, PT, OT, SLP.  Lives alone, neighbors are helpful

, but he may need a conservator.  Functional goal is independence or modified 

independence, and least restrictive diet.  Discharge date set for 4/9/2019.





Followup.  He will see Neurology, Dr. Malave, after his discharge.  He will also 

need to establish with a primary care provider.





04/02/19 14:28





Subjective: 





No complaints.  Sleeping well.  Not in pain.  No cough or dyspnea, no fevers or 

chills.  No bowel movement recorded since 03/31 but he denies any GI distress.


Objective: 





 Vital Signs











Temp Pulse Resp BP Pulse Ox


 


 36.8 C   83   15   146/81 H  94 


 


 04/02/19 12:55  04/02/19 12:55  04/02/19 07:39  04/02/19 12:55  04/02/19 12:55








 Laboratory Results





 03/26/19 15:50 





 03/26/19 15:50 





 











 04/01/19 04/02/19 04/03/19





 05:59 05:59 05:59


 


Intake Total 1460 1715 


 


Output Total 1000 850 250


 


Balance 460 865 -250














Physical Exam





- Physical Exam


General Appearance: WD/WN, alert, no apparent distress


Respiratory: normal breath sounds, No crackles, No rhonchi, No wheezing


Cardiac/Chest: regular rate, rhythm, No diastolic murmur, No systolic murmur


Skin: normal color, warm/dry


Neuro/Psych: alert, normal mood/affect, oriented x 3, motor weakness (Bilateral 

lower extremity, left from than right, on and descending 3 stairs in PT gym.), 

speech abnormalities (Dysarthric.)





ICD10 Worksheet


Patient Problems: 


 Problems











Problem Status Onset


 


Aphasic disturbance Acute  


 


Weakness Acute

## 2019-04-03 RX ADMIN — STANDARDIZED SENNA CONCENTRATE SCH TAB: 8.6 TABLET ORAL at 19:51

## 2019-04-03 RX ADMIN — POLYETHYLENE GLYCOL 3350 SCH GM: 17 POWDER, FOR SOLUTION ORAL at 08:47

## 2019-04-03 RX ADMIN — METOPROLOL SUCCINATE SCH MG: 25 TABLET, EXTENDED RELEASE ORAL at 08:47

## 2019-04-03 NOTE — SOAPPROG
SOAP Progress Note


Assessment/Plan: 


Assessment:





Dysphagia and dysarthria, progressive over approximately 5 months, with an 

unknown etiology at present.  He has had some improvements during his 

hospitalization.  


* Disorganized oral phase of swallowing with pharyngeal pooling.  Lingual 

function with weakness and decreased coordination impacts articulation of 

speech and swallowing.


* Initially on nectar-thick liquids and a dysphagia 1 diet.  Advanced to 

dysphagia 2 diet and thin liquids, 3/29/2019.


* Continue SLP





Debility with right-sided weakness in a formerly right-handed man.  


* Initial functional independence measure is 68 on 3/27/2019.  Scored 21/56 on 

the Vides balance test.  Independent with bed mobility.  Contact guard assist 

for transfers.  Ambulated 150 ft with contact guard assist using a front 

wheeled walker.  Benefitting from an bilateral AFOs for foot drop, which has 

been a chronic problem, likely due to spinal issues with history of lumbar 

surgery.  Climbed and descended 3 stairs with bilateral rails.  Grooming and 

hygiene are done standing with contact guard assist.  Upper body requires setup 

and standby assist.  Lower body dressing with minimal assist for the AFOs and 

shoes.  Bath transfers done with supervision and bathing with supervision.  

Toileting requires contact guard assist.  Decreased coordination more so on the 

right lower extremity than the left.


* Has since ambulated 500 ft outside with front wheeled walker on uneven 

surfaces, contact guard assist.  Advanced to independent in his room.  Reports 

that he is able to Don and doff AFOs independently.


* Continue treatment per Physical Therapy and Occupational Therapy.  





Risk for dehydration with need for thickened liquids.  


* BMP 3/26 with BUN over creatinine greater than 20-1.  Orthostatic by pulse on 

3/27/2019.


* Continue efforts at hydration.





Cognitive impairment.  Scored 19/30 on the [place] cognitive assessment.  

Prominent memory loss; also deficit to executive function, reasoning, lexical 

fluency..  


* MoCA score increased to 24/30 on 4/2/2019.  


* Continue treatment per speech and language pathology.





Constipation.  Had bowel movement 3/31/2019.  Continue laxatives.





Hypertension.  He was on metoprolol alone prior to admission and now has the 

addition of amlodipine. 


* Also with orthostasis.  He denies lightheadedness.  Orthostatic blood 

pressure much improved, 3/31/2019.


* Diastolic dysfunction and mild LVH on echo in the hospital.


* Increased amlodipine form 2.5 mg to 5 mg QD starting 3/27/2019, and decreased 

metoprolol ER from 50 mg QD to 25 mg QD to allow more heart rate reactivity to 

orthostasis.





Urinary incontinence.  Per report from [facility], he was continent of 

urine, but he had an incontinent episode when he arrived on the inpatient 

rehabilitation unit. 


* Negligible PVR.





Unclear indication for aspirin.  Discontinue starting 3/28/2019.





Prophylaxis.  He has had limited mobility which, combined with his age, 

increases his risk for deep venous thrombosis.  


* Therapies report much improved mobility.  Discontinue enoxaparin starting 3/27

/2019.





DISPOSITION:  Attended staffing, 3/27/2019, 15 min.  Discussed with case 

management, nursing, dietitian, PT, OT, SLP.  Lives alone, neighbors are helpful

, but he may need a conservator.  Functional goal is independence or modified 

independence, and least restrictive diet.  Discharge date set for 4/9/2019.





Followup.  He will see Neurology, Dr. Malave, after his discharge.  He will also 

need to establish with a primary care provider.








04/03/19 09:33





Subjective: 





No complaints.  Sleeping well.  Feeling stronger.  No cough or dyspnea, no 

fevers or chills.


Objective: 





 Vital Signs











Temp Pulse Resp BP Pulse Ox


 


 36.5 C   96   18   130/80 H  95 


 


 04/03/19 08:00  04/03/19 08:47  04/03/19 08:00  04/03/19 08:47  04/03/19 08:00








 Laboratory Results





 03/26/19 15:50 





 03/26/19 15:50 





 











 04/02/19 04/03/19 04/04/19





 05:59 05:59 05:59


 


Intake Total 1715 1030 


 


Output Total 850 250 


 


Balance 865 780 














Physical Exam





- Physical Exam


General Appearance: WD/WN, alert, no apparent distress


Respiratory: No respiratory distress, No accessory muscle use


Skin: normal color, warm/dry


Neuro/Psych: alert, normal mood/affect, oriented x 3, abnormal gait (Ambulating 

with PT using a walking staff.  Has LOB with contact guard by physical 

therapist.), speech abnormalities (Dysarthria)





ICD10 Worksheet


Patient Problems: 


 Problems











Problem Status Onset


 


Aphasic disturbance Acute  


 


Weakness Acute

## 2019-04-03 NOTE — SOAPPROG
SOAP Progress Note


Assessment/Plan: 


Assessment:





Dysphagia and dysarthria, progressive over approximately 5 months, with an 

unknown etiology at present.  He has had some improvements during his 

hospitalization.  


* Disorganized oral phase of swallowing with pharyngeal pooling.  Lingual 

function with weakness and decreased coordination impacts articulation of 

speech and swallowing.


* Initially on nectar-thick liquids and a dysphagia 1 diet.  Advanced to 

dysphagia 2 diet and thin liquids, 3/29/2019.


* Continue SLP





Debility with right-sided weakness in a formerly right-handed man.  


* Initial functional independence measure is 61 on 3/27/2019 improved to 90 as 

of 4/3/2019.  Independent with bed mobility and ambulating with front wheeled 

walker or a 4 wheeled walker.  Walked graded 100 ft.  Needs standby assist for 

path finding and for walking outdoors.  Climbed and descended still of 12 

stairs.  Standby assist for car transfer.  Scored 21/56 on the Vides balance 

test.  Modified independent for ADLs.  Has shoulder and hand weakness.  

Benefitting from an bilateral AFOs for foot drop, which has been a chronic 

problem, likely due to spinal issues with history of lumbar surgery.  Advanced 

to independent in his room. 


* Continue treatment per Physical Therapy and Occupational Therapy.  





Risk for dehydration with need for thickened liquids.  


* BMP 3/26 with BUN over creatinine greater than 20-1.  Orthostatic by pulse on 

3/27/2019.


* Continue efforts at hydration.





Cognitive impairment.  Scored 19/30 on the [place] cognitive assessment.  


* MoCA score increased to 24/30 on 4/2/2019.  Continues with deficits to 

executive function, attention and problem-solving.  Will likely need assist 

with financial and medication management.


* Continue treatment per speech and language pathology.





Constipation.  Responding to laxatives.





Hypertension.  He was on metoprolol alone prior to admission and now has the 

addition of amlodipine. 


* Also with orthostasis.  He denies lightheadedness.  Orthostatic blood 

pressure much improved, 3/31/2019.


* Diastolic dysfunction and mild LVH on echo in the hospital.


* Increased amlodipine form 2.5 mg to 5 mg QD starting 3/27/2019, and decreased 

metoprolol ER from 50 mg QD to 25 mg QD to allow more heart rate reactivity to 

orthostasis.





Urinary incontinence.  Per report from [facility], he was continent of 

urine, but he had an incontinent episode when he arrived on the inpatient 

rehabilitation unit. 


* Negligible PVR.


* No subsequent episodes of incontinence.





Unclear indication for aspirin.  Discontinue starting 3/28/2019.





Prophylaxis.  He has had limited mobility which, combined with his age, 

increases his risk for deep venous thrombosis.  


* Therapies report much improved mobility.  Discontinue enoxaparin starting 3/27

/2019.





DISPOSITION:  Attended staffing, 4/3/2019, 15 min.  Discussed with case 

management, nursing, dietitian, PT, OT, SLP.  Lives alone, neighbors are helpful

, MD POA is a friend of his brother who lives in [place].  Eventual moved to 

[place] is possible.  Functional goal is independence or modified independence

, and least restrictive diet.  Discharge date set for 4/5/2019.





Followup.  He will see Neurology, Dr. Malave, after his discharge.  He will also 

need to establish with a primary care provider.





04/03/19 11:25





Objective: 





 Vital Signs











Temp Pulse Resp BP Pulse Ox


 


 36.5 C   96   18   130/80 H  95 


 


 04/03/19 08:00  04/03/19 08:47  04/03/19 08:00  04/03/19 08:47  04/03/19 08:00








 Laboratory Results





 03/26/19 15:50 





 03/26/19 15:50 





 











 04/02/19 04/03/19 04/04/19





 05:59 05:59 05:59


 


Intake Total 1715 1030 


 


Output Total 118 250 


 


Balance 865 780 














- Time Spent With Patient


Time Spent With Patient: 





Greater than 35 min floor time today, including more than 50% of time in 

coordination of care during staffing meeting, and counseling patient.





ICD10 Worksheet


Patient Problems: 


 Problems











Problem Status Onset


 


Aphasic disturbance Acute  


 


Weakness Acute

## 2019-04-04 RX ADMIN — METOPROLOL SUCCINATE SCH MG: 25 TABLET, EXTENDED RELEASE ORAL at 09:13

## 2019-04-04 RX ADMIN — STANDARDIZED SENNA CONCENTRATE SCH TAB: 8.6 TABLET ORAL at 20:30

## 2019-04-04 RX ADMIN — POLYETHYLENE GLYCOL 3350 SCH GM: 17 POWDER, FOR SOLUTION ORAL at 09:13

## 2019-04-04 NOTE — PDOREHIP
Admission IRF-ROB





- Admission - 3 Day Assessment Period


Admission Date/Day 1: 03/25/19


Day 2: 03/26/19


Day 3: 03/27/19





- Active Diagnoses


Comorbidities and Co-existing Conditions at Admission: 63545. None of the Above





Discharge IRF-ROB





- Discharge  - 3 Day Assessment Period


2 Days Prior to Anticipated Discharge Date: 04/03/19


1 Day Prior to Anticipated Discharge Date: 04/04/19


Anticipated Discharge Date: 04/05/19





- Discharge Skin Conditions


Unhealed Pressure Ulcer (1 or more/Stage 1 or >)-Discharge: 0. No


# Stage 1 Pressure Ulcers-Discharge: 0


# Stage 2 Pressure Ulcers-Discharge: 0


# of These Stage 2 Pressure Ulcers Present on Admission: 0


# Stage 3 Pressure Ulcers-Discharge: 0


# of These Stage 3 Pressure Ulcers Present on Admission: 0


# Stage 4 Pressure Ulcers-Discharge: 0


# of These Stage 4 Pressure Ulcers Present on Admission: 0


# Unstageable Pressure Ulcers (Non-remove Dress)-Discharge: 0


# These Unstageable Pressure Ulcers (NRD)-Present on Admit: 0


# Unstageable Pressure Ulcers (Slough/Eschar)-Discharge: 0


# These Unstageable Pressure Ulcers(Slough) Present on Admit: 0


# Unstageable Pressure Ulcers (Deep Tissue Injury)-Discharge: 0


# These Unstageable Pressure Ulcers (DTI) Present on Admit: 0

## 2019-04-04 NOTE — SOAPPROG
SOAP Progress Note


Assessment/Plan: 


Assessment:








Dysphagia and dysarthria, progressive over approximately 5 months, with an 

unknown etiology.  Antibody tests negative for myasthenia gravis. 


* Disorganized oral phase of swallowing with pharyngeal pooling.  Lingual 

function with weakness and decreased coordination impacts articulation of 

speech and swallowing.


* Initially on nectar-thick liquids and a dysphagia 1 diet.  Advanced to 

dysphagia 2 diet and thin liquids, 3/29/2019.


* Continue SLP





Debility with right-sided weakness in a formerly right-handed man.  


* Initial functional independence measure is 61 on 3/27/2019 improved to 90 as 

of 4/3/2019.  Independent with bed mobility and ambulating with front wheeled 

walker or a 4 wheeled walker.  Walked greater than 150 ft, independent with 4 

wheeled walker.  Needs standby assist for path finding and for walking 

outdoors.  Climbed and descended 12 stairs.  Standby assist for car transfer.  

Scored 21/56 on the Vides balance test.  Modified independent for ADLs.  Has 

shoulder and hand weakness.  Benefitting from an bilateral AFOs for foot drop, 

which has been a chronic problem, likely due to spinal issues with history of 

lumbar surgery.  Advanced to independent in his room. 


* Continue treatment per Physical Therapy and Occupational Therapy.  





Risk for dehydration with need for thickened liquids.  


* BMP 3/26 with BUN over creatinine greater than 20-1.  Orthostatic by pulse on 

3/27/2019.


* Continue efforts at hydration.





Cognitive impairment.  Scored 19/30 on the [place] cognitive assessment.  


* MoCA score increased to 24/30 on 4/2/2019.  Continues with deficits to 

executive function, attention and problem-solving.  Will likely need assist 

with financial and medication management.


* Continue treatment per speech and language pathology.





Constipation.  Responding to laxatives.  Usual habit is every several days.





Hypertension.  He was on metoprolol alone prior to admission and now has the 

addition of amlodipine. 


* Also with orthostasis.  He denies lightheadedness.  Orthostatic blood 

pressure much improved, 3/31/2019.


* Diastolic dysfunction and mild LVH on echo in the hospital.


* Increased amlodipine form 2.5 mg to 5 mg QD starting 3/27/2019, and decreased 

metoprolol ER from 50 mg QD to 25 mg QD to allow more heart rate reactivity to 

orthostasis.





Urinary incontinence.  Per report from [facility], he was continent of 

urine, but he had an incontinent episode when he arrived on the inpatient 

rehabilitation unit. 


* Negligible PVR.


* No subsequent episodes of incontinence.





Unclear indication for aspirin.  Discontinue starting 3/28/2019.





Prophylaxis.  He has had limited mobility which, combined with his age, 

increases his risk for deep venous thrombosis.  


* Therapies report much improved mobility.  Discontinue enoxaparin starting 3/27

/2019.





DISPOSITION:  Attended staffing, 4/3/2019, 15 min.  Discussed with case 

management, nursing, dietitian, PT, OT, SLP.  Lives alone, neighbors are helpful

, MD POA is a friend of his brother who lives in [place].  Eventual move to 

[place] is possible.  Functional goal is independence or modified independence

, and least restrictive diet.  Discharge date set for 4/5/2019.





Followup.  He will see Neurology, Dr. Malave, after his discharge.  He will also 

need to establish with a primary care provider.





04/03/19 11:25





04/04/19 11:04





Subjective: 





No complaints.  Feels ready to go home tomorrow.  No bowel movement for several 

days but he does not feel uncomfortable.


Objective: 





 Vital Signs











Temp Pulse Resp BP Pulse Ox


 


 36.1 C   84   16   109/77   94 


 


 04/04/19 08:00  04/04/19 09:13  04/04/19 08:00  04/04/19 09:13  04/04/19 08:00








 Laboratory Results





 03/26/19 15:50 





 03/26/19 15:50 





 











 04/03/19 04/04/19 04/05/19





 05:59 05:59 05:59


 


Intake Total 1030 2640 


 


Output Total 250  


 


Balance 780 2640 














Physical Exam





- Physical Exam


General Appearance: WD/WN, alert, no apparent distress


Respiratory: No respiratory distress, No accessory muscle use


Skin: normal color, warm/dry


Neuro/Psych: alert, normal mood/affect, oriented x 3, speech abnormalities (

Dysarthric)





ICD10 Worksheet


Patient Problems: 


 Problems











Problem Status Onset


 


Aphasic disturbance Acute  


 


Weakness Acute

## 2019-04-05 VITALS — SYSTOLIC BLOOD PRESSURE: 120 MMHG | DIASTOLIC BLOOD PRESSURE: 79 MMHG

## 2019-04-05 RX ADMIN — METOPROLOL SUCCINATE SCH MG: 25 TABLET, EXTENDED RELEASE ORAL at 09:13

## 2019-04-05 RX ADMIN — POLYETHYLENE GLYCOL 3350 SCH GM: 17 POWDER, FOR SOLUTION ORAL at 09:13

## 2019-04-05 NOTE — GDS
[f 
rep st]



                                                             DISCHARGE SUMMARY





ADMITTING DIAGNOSES:  Neurodegenerative disorder of unclear etiology with 
dysphagia, dysarthria, and weakness.



DISCHARGE DIAGNOSES:  Neurodegenerative disorder of unclear etiology with 
dysphagia, dysarthria, and weakness.



OTHER DISCHARGE DIAGNOSIS:  Hypertension.



COMPLICATIONS:  There were none.



CONSULTATIONS:  There were none.



PROCEDURES:  There were none.



HISTORY AND HOSPITAL COURSE:  This patient was admitted from [facility].  He had presented there on 03/17/2019 with 
dysarthria and tachycardia.  He was evaluated for a stroke, but head CT and CT 
angiogram were negative.  Brain MRI showed minimal nonspecific periventricular 
and subcortical white matter disease in the frontal and parietal lobes.  He was 
evaluated for possible C-spine disease causing his weakness.  The neurosurgery 
consultant advised that this was not the case, though he had moderate to severe 
degenerative disk disease at C4-5 through C6-7.  Out of concern for possible 
myasthenia gravis, he was treated with high-dose IV corticosteroids.  He had 
some improvement.  He continued to have significant dysphagia and was on a 
pureed diet with nectar-thick liquids. 



He did well in rehabilitation.  Initially, he was needing assistance with most 
activities of daily living and with mobility.  He progressed to being 
independent in his room and on the unit prior to his discharge.  He had 
bilateral footdrop, likely due to a history of lumbar spinal disease and spinal 
surgery.  This greatly improved with ankle-foot orthoses.  He was independent 
with bed mobility and transfers, and ambulated 150 feet or greater with a 4-
wheeled walker independently indoors and outdoors.  He climbed and descended 12 
stairs with 1 rail, cues, and supervision for safety.  He was independent with 
activities of daily living.  He was seen by Speech and Language Pathology and 
advanced from a dysphagia 1 diet with nectar-thick liquids to a dysphagia 3 
diet with thin liquids. 



He was noted to have cognitive impairment.  He scored 19/30 initially on the 
[place] Cognitive Assessment.  He was retested on 04/22/2019 and scored 24/30, 
which was still in the significantly cognitively impaired range.  He had 
deficits to executive function, attention, and problem-solving.  He will need 
assistance with financial and medication management. 



He continued to have dysarthria, which was moderate, but it had improved during 
his stay. 



He had a history of hypertension and was taking metoprolol prior to his 
hospitalization.  Amlodipine was added during his hospitalization. On the 
rehabilitation unit, he was noted to have orthostasis, which improved with 
improved oral intake.  Metoprolol was decreased from 50 mg to 25 mg daily, and 
amlodipine was increased from 2.5 mg to 5 mg daily in order to allow more heart 
rate response to his orthostasis. 



He was taking aspirin prior to his hospitalization.  On review of his medical 
records, there was no clear indication to continue aspirin, so it was 
discontinued.



DISCHARGE PLAN AND DISPOSITION:  He is returning home alone, but will have 
assistance from neighbors.



DISCHARGE CONDITION:  Good.



DIET:  Regular with dysphagia 3 texture and thin liquids.



ACTIVITY:  Ad stacy, but he will need supervision or assistance with medication 
and financial management.



ALLERGIES:  There are no new drug allergies.  He continues to have the listed 
allergy to penicillin.



MEDICATIONS ON DISCHARGE:  

1.  Acetaminophen 650 mg p.o. q.4 h. p.r.n.

2.  Amlodipine 2.5 mg p.o. daily.

3.  Metoprolol succinate XR 25 mg p.o. daily.

4.  Polyethylene glycol 17 g p.o. daily.

5.  Senna 1 tablet p.o. at bedtime.



ISSUES TO BE ADDRESSED AT FOLLOWUP:  

1.  Mobility and activities of daily living.  He will continue to have home PT 
and OT, and can follow up with primary care regarding his progress.

2.  Dysphagia and dysarthria.  He will continue with home speech and language 
pathology, and can follow up with his primary care provider.

3.  Hypertension, with medication changes during his stay.  He can follow up 
with Primary Care.

4.  Cognitive impairment has been improving.  Continue speech and language 
pathology at home, and follow up with Primary Care.

5.  Neurodegenerative disorder of unknown etiology.  Labs for myasthenia gravis 
from the hospital were negative.  He will follow up with neurologist, Dr. Shane Malave.





Job #:  [id number]

MTDD

## 2019-04-30 ENCOUNTER — HOSPITAL ENCOUNTER (OUTPATIENT)
Dept: HOSPITAL 80 - BMCIMAGING | Age: 64
End: 2019-04-30
Attending: PHYSICIAN ASSISTANT
Payer: COMMERCIAL

## 2019-04-30 DIAGNOSIS — B18.2: Primary | ICD-10-CM
